# Patient Record
Sex: MALE | Race: BLACK OR AFRICAN AMERICAN | Employment: OTHER
[De-identification: names, ages, dates, MRNs, and addresses within clinical notes are randomized per-mention and may not be internally consistent; named-entity substitution may affect disease eponyms.]

---

## 2017-01-01 ENCOUNTER — HOME CARE VISIT (OUTPATIENT)
Dept: SCHEDULING | Facility: HOME HEALTH | Age: 72
End: 2017-01-01
Payer: MEDICARE

## 2017-01-01 ENCOUNTER — HOME CARE VISIT (OUTPATIENT)
Dept: HOSPICE | Facility: HOSPICE | Age: 72
End: 2017-01-01
Payer: MEDICARE

## 2017-01-01 ENCOUNTER — DOCUMENTATION ONLY (OUTPATIENT)
Dept: PALLATIVE CARE | Age: 72
End: 2017-01-01

## 2017-01-01 ENCOUNTER — NURSE NAVIGATOR (OUTPATIENT)
Dept: PALLATIVE CARE | Age: 72
End: 2017-01-01

## 2017-01-01 ENCOUNTER — HOSPITAL ENCOUNTER (EMERGENCY)
Age: 72
Discharge: HOME | End: 2017-10-11
Attending: EMERGENCY MEDICINE | Admitting: EMERGENCY MEDICINE
Payer: COMMERCIAL

## 2017-01-01 ENCOUNTER — APPOINTMENT (OUTPATIENT)
Dept: GENERAL RADIOLOGY | Age: 72
End: 2017-01-01
Attending: EMERGENCY MEDICINE
Payer: COMMERCIAL

## 2017-01-01 ENCOUNTER — HOSPITAL ENCOUNTER (EMERGENCY)
Age: 72
Discharge: HOME | End: 2017-02-19
Attending: EMERGENCY MEDICINE
Payer: COMMERCIAL

## 2017-01-01 ENCOUNTER — TELEPHONE (OUTPATIENT)
Dept: FAMILY MEDICINE CLINIC | Age: 72
End: 2017-01-01

## 2017-01-01 ENCOUNTER — HOSPICE ADMISSION (OUTPATIENT)
Dept: HOSPICE | Facility: HOSPICE | Age: 72
End: 2017-01-01
Payer: MEDICARE

## 2017-01-01 ENCOUNTER — OFFICE VISIT (OUTPATIENT)
Dept: PALLATIVE CARE | Age: 72
End: 2017-01-01

## 2017-01-01 ENCOUNTER — OFFICE VISIT (OUTPATIENT)
Dept: FAMILY MEDICINE CLINIC | Age: 72
End: 2017-01-01

## 2017-01-01 ENCOUNTER — APPOINTMENT (OUTPATIENT)
Dept: CT IMAGING | Age: 72
End: 2017-01-01
Attending: EMERGENCY MEDICINE
Payer: COMMERCIAL

## 2017-01-01 ENCOUNTER — HOSPITAL ENCOUNTER (EMERGENCY)
Age: 72
Discharge: HOME | End: 2017-08-29
Attending: EMERGENCY MEDICINE
Payer: COMMERCIAL

## 2017-01-01 ENCOUNTER — HOSPITAL ENCOUNTER (EMERGENCY)
Age: 72
Discharge: HOME | End: 2017-09-04
Attending: EMERGENCY MEDICINE
Payer: COMMERCIAL

## 2017-01-01 ENCOUNTER — TELEPHONE (OUTPATIENT)
Dept: PALLATIVE CARE | Age: 72
End: 2017-01-01

## 2017-01-01 ENCOUNTER — HOSPITAL ENCOUNTER (OUTPATIENT)
Dept: INTERVENTIONAL RADIOLOGY/VASCULAR | Age: 72
Discharge: HOME | End: 2017-11-07
Attending: INTERNAL MEDICINE
Payer: COMMERCIAL

## 2017-01-01 VITALS
HEART RATE: 54 BPM | OXYGEN SATURATION: 99 % | SYSTOLIC BLOOD PRESSURE: 142 MMHG | DIASTOLIC BLOOD PRESSURE: 74 MMHG | RESPIRATION RATE: 16 BRPM

## 2017-01-01 VITALS
DIASTOLIC BLOOD PRESSURE: 79 MMHG | SYSTOLIC BLOOD PRESSURE: 134 MMHG | TEMPERATURE: 98 F | HEART RATE: 78 BPM | RESPIRATION RATE: 16 BRPM | OXYGEN SATURATION: 98 %

## 2017-01-01 VITALS
RESPIRATION RATE: 18 BRPM | DIASTOLIC BLOOD PRESSURE: 68 MMHG | HEIGHT: 67 IN | BODY MASS INDEX: 18.05 KG/M2 | WEIGHT: 115 LBS | SYSTOLIC BLOOD PRESSURE: 122 MMHG | HEART RATE: 88 BPM | OXYGEN SATURATION: 100 % | TEMPERATURE: 97.6 F

## 2017-01-01 VITALS
RESPIRATION RATE: 14 BRPM | TEMPERATURE: 97.7 F | DIASTOLIC BLOOD PRESSURE: 79 MMHG | HEART RATE: 79 BPM | SYSTOLIC BLOOD PRESSURE: 162 MMHG | BODY MASS INDEX: 17.64 KG/M2 | WEIGHT: 126 LBS | OXYGEN SATURATION: 94 % | HEIGHT: 71 IN

## 2017-01-01 VITALS
SYSTOLIC BLOOD PRESSURE: 203 MMHG | HEART RATE: 86 BPM | HEIGHT: 71 IN | DIASTOLIC BLOOD PRESSURE: 117 MMHG | TEMPERATURE: 98.2 F | RESPIRATION RATE: 14 BRPM | OXYGEN SATURATION: 98 % | BODY MASS INDEX: 17.81 KG/M2 | WEIGHT: 127.21 LBS

## 2017-01-01 VITALS
SYSTOLIC BLOOD PRESSURE: 108 MMHG | WEIGHT: 120 LBS | OXYGEN SATURATION: 90 % | HEART RATE: 62 BPM | HEIGHT: 69 IN | BODY MASS INDEX: 17.77 KG/M2 | DIASTOLIC BLOOD PRESSURE: 56 MMHG | RESPIRATION RATE: 16 BRPM

## 2017-01-01 VITALS
HEART RATE: 73 BPM | SYSTOLIC BLOOD PRESSURE: 114 MMHG | OXYGEN SATURATION: 99 % | TEMPERATURE: 97.7 F | RESPIRATION RATE: 16 BRPM | DIASTOLIC BLOOD PRESSURE: 66 MMHG

## 2017-01-01 VITALS
SYSTOLIC BLOOD PRESSURE: 122 MMHG | DIASTOLIC BLOOD PRESSURE: 70 MMHG | HEART RATE: 67 BPM | TEMPERATURE: 97 F | RESPIRATION RATE: 16 BRPM | OXYGEN SATURATION: 95 %

## 2017-01-01 VITALS
SYSTOLIC BLOOD PRESSURE: 148 MMHG | TEMPERATURE: 97.3 F | DIASTOLIC BLOOD PRESSURE: 85 MMHG | RESPIRATION RATE: 18 BRPM | OXYGEN SATURATION: 100 %

## 2017-01-01 VITALS — SYSTOLIC BLOOD PRESSURE: 132 MMHG | DIASTOLIC BLOOD PRESSURE: 82 MMHG | HEART RATE: 80 BPM | RESPIRATION RATE: 18 BRPM

## 2017-01-01 VITALS
OXYGEN SATURATION: 99 % | HEART RATE: 57 BPM | DIASTOLIC BLOOD PRESSURE: 62 MMHG | RESPIRATION RATE: 16 BRPM | SYSTOLIC BLOOD PRESSURE: 108 MMHG | TEMPERATURE: 95.8 F

## 2017-01-01 VITALS
TEMPERATURE: 97.1 F | HEART RATE: 77 BPM | OXYGEN SATURATION: 97 % | RESPIRATION RATE: 20 BRPM | DIASTOLIC BLOOD PRESSURE: 86 MMHG | SYSTOLIC BLOOD PRESSURE: 142 MMHG

## 2017-01-01 VITALS
BODY MASS INDEX: 18.84 KG/M2 | DIASTOLIC BLOOD PRESSURE: 32 MMHG | RESPIRATION RATE: 16 BRPM | HEIGHT: 69 IN | HEART RATE: 78 BPM | WEIGHT: 127.21 LBS | TEMPERATURE: 97.5 F | SYSTOLIC BLOOD PRESSURE: 151 MMHG | OXYGEN SATURATION: 100 %

## 2017-01-01 VITALS
WEIGHT: 124 LBS | TEMPERATURE: 98 F | DIASTOLIC BLOOD PRESSURE: 84 MMHG | OXYGEN SATURATION: 91 % | HEART RATE: 94 BPM | HEIGHT: 71 IN | RESPIRATION RATE: 18 BRPM | BODY MASS INDEX: 17.36 KG/M2 | SYSTOLIC BLOOD PRESSURE: 138 MMHG

## 2017-01-01 VITALS
HEART RATE: 61 BPM | SYSTOLIC BLOOD PRESSURE: 98 MMHG | TEMPERATURE: 98.3 F | OXYGEN SATURATION: 99 % | RESPIRATION RATE: 16 BRPM | DIASTOLIC BLOOD PRESSURE: 50 MMHG

## 2017-01-01 VITALS
DIASTOLIC BLOOD PRESSURE: 70 MMHG | TEMPERATURE: 96.9 F | RESPIRATION RATE: 24 BRPM | HEART RATE: 81 BPM | OXYGEN SATURATION: 99 % | SYSTOLIC BLOOD PRESSURE: 130 MMHG

## 2017-01-01 DIAGNOSIS — J02.9 ACUTE PHARYNGITIS, UNSPECIFIED ETIOLOGY: Primary | ICD-10-CM

## 2017-01-01 DIAGNOSIS — B20 HIV (HUMAN IMMUNODEFICIENCY VIRUS INFECTION) (HCC): ICD-10-CM

## 2017-01-01 DIAGNOSIS — J18.9 CAP (COMMUNITY ACQUIRED PNEUMONIA): Primary | ICD-10-CM

## 2017-01-01 DIAGNOSIS — C32.0 VOCAL CORD CANCER (HCC): ICD-10-CM

## 2017-01-01 DIAGNOSIS — Z65.8 PSYCHOSOCIAL DISTRESS: ICD-10-CM

## 2017-01-01 DIAGNOSIS — J32.9 SINUSITIS, UNSPECIFIED CHRONICITY, UNSPECIFIED LOCATION: ICD-10-CM

## 2017-01-01 DIAGNOSIS — Z93.1 S/P PERCUTANEOUS ENDOSCOPIC GASTROSTOMY (PEG) TUBE PLACEMENT (HCC): ICD-10-CM

## 2017-01-01 DIAGNOSIS — C32.9 LARYNGEAL CANCER (HCC): ICD-10-CM

## 2017-01-01 DIAGNOSIS — G89.3 CANCER ASSOCIATED PAIN: ICD-10-CM

## 2017-01-01 DIAGNOSIS — M54.2 NECK PAIN: Primary | ICD-10-CM

## 2017-01-01 DIAGNOSIS — C32.9 LARYNGEAL CANCER (HCC): Primary | ICD-10-CM

## 2017-01-01 DIAGNOSIS — G44.219 EPISODIC TENSION-TYPE HEADACHE, NOT INTRACTABLE: ICD-10-CM

## 2017-01-01 DIAGNOSIS — Z93.0 TRACHEOSTOMY STATUS (HCC): ICD-10-CM

## 2017-01-01 DIAGNOSIS — C32.0 MALIGNANT NEOPLASM OF VOCAL CORD (HCC): ICD-10-CM

## 2017-01-01 DIAGNOSIS — M62.838 TRAPEZIUS MUSCLE SPASM: ICD-10-CM

## 2017-01-01 DIAGNOSIS — N17.9 AKI (ACUTE KIDNEY INJURY) (HCC): ICD-10-CM

## 2017-01-01 DIAGNOSIS — K94.23 MALFUNCTION OF GASTROSTOMY TUBE (HCC): Primary | ICD-10-CM

## 2017-01-01 DIAGNOSIS — R63.30 FEEDING DIFFICULTIES: ICD-10-CM

## 2017-01-01 DIAGNOSIS — B20 HIV (HUMAN IMMUNODEFICIENCY VIRUS INFECTION) (HCC): Primary | ICD-10-CM

## 2017-01-01 LAB
ALBUMIN SERPL BCP-MCNC: 2.6 G/DL (ref 3.5–5)
ALBUMIN SERPL-MCNC: 2.6 G/DL (ref 3.5–5)
ALBUMIN SERPL-MCNC: 2.8 G/DL (ref 3.5–5)
ALBUMIN/GLOB SERPL: 0.5 {RATIO} (ref 1.1–2.2)
ALBUMIN/GLOB SERPL: 0.5 {RATIO} (ref 1.1–2.2)
ALBUMIN/GLOB SERPL: 0.6 {RATIO} (ref 1.1–2.2)
ALP SERPL-CCNC: 100 U/L (ref 45–117)
ALP SERPL-CCNC: 88 U/L (ref 45–117)
ALP SERPL-CCNC: 89 U/L (ref 45–117)
ALT SERPL-CCNC: 10 U/L (ref 12–78)
ALT SERPL-CCNC: 25 U/L (ref 12–78)
ALT SERPL-CCNC: 25 U/L (ref 12–78)
ANION GAP BLD CALC-SCNC: 10 MMOL/L (ref 5–15)
ANION GAP SERPL CALC-SCNC: 4 MMOL/L (ref 5–15)
ANION GAP SERPL CALC-SCNC: 5 MMOL/L (ref 5–15)
APPEARANCE UR: CLEAR
APPEARANCE UR: CLEAR
AST SERPL W P-5'-P-CCNC: 12 U/L (ref 15–37)
AST SERPL-CCNC: 21 U/L (ref 15–37)
AST SERPL-CCNC: 21 U/L (ref 15–37)
ATRIAL RATE: 86 BPM
BACTERIA SPEC CULT: NORMAL
BACTERIA URNS QL MICRO: ABNORMAL /HPF
BACTERIA URNS QL MICRO: ABNORMAL /HPF
BASOPHILS # BLD AUTO: 0 K/UL (ref 0–0.1)
BASOPHILS # BLD: 0 % (ref 0–1)
BASOPHILS # BLD: 0 K/UL (ref 0–0.1)
BASOPHILS # BLD: 0 K/UL (ref 0–0.1)
BASOPHILS NFR BLD: 0 % (ref 0–1)
BASOPHILS NFR BLD: 0 % (ref 0–1)
BILIRUB SERPL-MCNC: 0.2 MG/DL (ref 0.2–1)
BILIRUB SERPL-MCNC: 0.2 MG/DL (ref 0.2–1)
BILIRUB SERPL-MCNC: 0.5 MG/DL (ref 0.2–1)
BILIRUB UR QL: NEGATIVE
BILIRUB UR QL: NEGATIVE
BUN SERPL-MCNC: 15 MG/DL (ref 6–20)
BUN SERPL-MCNC: 16 MG/DL (ref 6–20)
BUN SERPL-MCNC: 19 MG/DL (ref 6–20)
BUN/CREAT SERPL: 13 (ref 12–20)
BUN/CREAT SERPL: 17 (ref 12–20)
BUN/CREAT SERPL: 17 (ref 12–20)
CALCIUM SERPL-MCNC: 8.3 MG/DL (ref 8.5–10.1)
CALCIUM SERPL-MCNC: 8.7 MG/DL (ref 8.5–10.1)
CALCIUM SERPL-MCNC: 8.8 MG/DL (ref 8.5–10.1)
CALCULATED P AXIS, ECG09: 71 DEGREES
CALCULATED R AXIS, ECG10: 74 DEGREES
CALCULATED T AXIS, ECG11: 77 DEGREES
CHLORIDE SERPL-SCNC: 101 MMOL/L (ref 97–108)
CHLORIDE SERPL-SCNC: 94 MMOL/L (ref 97–108)
CHLORIDE SERPL-SCNC: 99 MMOL/L (ref 97–108)
CO2 SERPL-SCNC: 26 MMOL/L (ref 21–32)
CO2 SERPL-SCNC: 30 MMOL/L (ref 21–32)
CO2 SERPL-SCNC: 30 MMOL/L (ref 21–32)
COLOR UR: ABNORMAL
COLOR UR: ABNORMAL
CREAT SERPL-MCNC: 0.86 MG/DL (ref 0.7–1.3)
CREAT SERPL-MCNC: 0.96 MG/DL (ref 0.7–1.3)
CREAT SERPL-MCNC: 1.47 MG/DL (ref 0.7–1.3)
DEPRECATED S PYO AG THROAT QL EIA: NEGATIVE
DIAGNOSIS, 93000: NORMAL
EOSINOPHIL # BLD: 0.1 K/UL (ref 0–0.4)
EOSINOPHIL # BLD: 0.2 K/UL (ref 0–0.4)
EOSINOPHIL # BLD: 0.2 K/UL (ref 0–0.4)
EOSINOPHIL NFR BLD: 1 % (ref 0–7)
EOSINOPHIL NFR BLD: 1 % (ref 0–7)
EOSINOPHIL NFR BLD: 2 % (ref 0–7)
EPITH CASTS URNS QL MICRO: ABNORMAL /LPF
EPITH CASTS URNS QL MICRO: ABNORMAL /LPF
ERYTHROCYTE [DISTWIDTH] IN BLOOD BY AUTOMATED COUNT: 13.3 % (ref 11.5–14.5)
ERYTHROCYTE [DISTWIDTH] IN BLOOD BY AUTOMATED COUNT: 16.9 % (ref 11.5–14.5)
ERYTHROCYTE [DISTWIDTH] IN BLOOD BY AUTOMATED COUNT: 17.8 % (ref 11.5–14.5)
GLOBULIN SER CALC-MCNC: 4.9 G/DL (ref 2–4)
GLOBULIN SER CALC-MCNC: 5.2 G/DL (ref 2–4)
GLOBULIN SER CALC-MCNC: 5.6 G/DL (ref 2–4)
GLUCOSE SERPL-MCNC: 73 MG/DL (ref 65–100)
GLUCOSE SERPL-MCNC: 76 MG/DL (ref 65–100)
GLUCOSE SERPL-MCNC: 96 MG/DL (ref 65–100)
GLUCOSE UR STRIP.AUTO-MCNC: NEGATIVE MG/DL
GLUCOSE UR STRIP.AUTO-MCNC: NEGATIVE MG/DL
HCT VFR BLD AUTO: 27.4 % (ref 36.6–50.3)
HCT VFR BLD AUTO: 31 % (ref 36.6–50.3)
HCT VFR BLD AUTO: 32.1 % (ref 36.6–50.3)
HGB BLD-MCNC: 10.3 G/DL (ref 12.1–17)
HGB BLD-MCNC: 10.7 G/DL (ref 12.1–17)
HGB BLD-MCNC: 8.9 G/DL (ref 12.1–17)
HGB UR QL STRIP: NEGATIVE
HGB UR QL STRIP: NEGATIVE
HYALINE CASTS URNS QL MICRO: ABNORMAL /LPF (ref 0–5)
KETONES UR QL STRIP.AUTO: NEGATIVE MG/DL
KETONES UR QL STRIP.AUTO: NEGATIVE MG/DL
LACTATE SERPL-SCNC: 0.7 MMOL/L (ref 0.4–2)
LEUKOCYTE ESTERASE UR QL STRIP.AUTO: ABNORMAL
LEUKOCYTE ESTERASE UR QL STRIP.AUTO: ABNORMAL
LYMPHOCYTES # BLD AUTO: 27 % (ref 12–49)
LYMPHOCYTES # BLD: 4.2 K/UL (ref 0.8–3.5)
LYMPHOCYTES # BLD: 4.4 K/UL (ref 0.8–3.5)
LYMPHOCYTES # BLD: 4.6 K/UL (ref 0.8–3.5)
LYMPHOCYTES NFR BLD: 54 % (ref 12–49)
LYMPHOCYTES NFR BLD: 56 % (ref 12–49)
MCH RBC QN AUTO: 30.5 PG (ref 26–34)
MCH RBC QN AUTO: 31.1 PG (ref 26–34)
MCH RBC QN AUTO: 31.2 PG (ref 26–34)
MCHC RBC AUTO-ENTMCNC: 32.5 G/DL (ref 30–36.5)
MCHC RBC AUTO-ENTMCNC: 33.2 G/DL (ref 30–36.5)
MCHC RBC AUTO-ENTMCNC: 33.3 G/DL (ref 30–36.5)
MCV RBC AUTO: 91.5 FL (ref 80–99)
MCV RBC AUTO: 93.7 FL (ref 80–99)
MCV RBC AUTO: 96.1 FL (ref 80–99)
MONOCYTES # BLD: 0.4 K/UL (ref 0–1)
MONOCYTES # BLD: 0.4 K/UL (ref 0–1)
MONOCYTES # BLD: 0.6 K/UL (ref 0–1)
MONOCYTES NFR BLD AUTO: 3 % (ref 5–13)
MONOCYTES NFR BLD: 6 % (ref 5–13)
MONOCYTES NFR BLD: 7 % (ref 5–13)
NEUTS SEG # BLD: 11.3 K/UL (ref 1.8–8)
NEUTS SEG # BLD: 2.9 K/UL (ref 1.8–8)
NEUTS SEG # BLD: 3.1 K/UL (ref 1.8–8)
NEUTS SEG NFR BLD AUTO: 69 % (ref 32–75)
NEUTS SEG NFR BLD: 35 % (ref 32–75)
NEUTS SEG NFR BLD: 39 % (ref 32–75)
NITRITE UR QL STRIP.AUTO: NEGATIVE
NITRITE UR QL STRIP.AUTO: NEGATIVE
P-R INTERVAL, ECG05: 134 MS
PH UR STRIP: 7 [PH] (ref 5–8)
PH UR STRIP: 7.5 [PH] (ref 5–8)
PLATELET # BLD AUTO: 319 K/UL (ref 150–400)
PLATELET # BLD AUTO: 324 K/UL (ref 150–400)
PLATELET # BLD AUTO: 454 K/UL (ref 150–400)
POTASSIUM SERPL-SCNC: 3.9 MMOL/L (ref 3.5–5.1)
POTASSIUM SERPL-SCNC: 4.2 MMOL/L (ref 3.5–5.1)
POTASSIUM SERPL-SCNC: 4.2 MMOL/L (ref 3.5–5.1)
PROT SERPL-MCNC: 7.7 G/DL (ref 6.4–8.2)
PROT SERPL-MCNC: 7.8 G/DL (ref 6.4–8.2)
PROT SERPL-MCNC: 8.2 G/DL (ref 6.4–8.2)
PROT UR STRIP-MCNC: NEGATIVE MG/DL
PROT UR STRIP-MCNC: NEGATIVE MG/DL
Q-T INTERVAL, ECG07: 362 MS
QRS DURATION, ECG06: 80 MS
QTC CALCULATION (BEZET), ECG08: 433 MS
RBC # BLD AUTO: 2.85 M/UL (ref 4.1–5.7)
RBC # BLD AUTO: 3.31 M/UL (ref 4.1–5.7)
RBC # BLD AUTO: 3.51 M/UL (ref 4.1–5.7)
RBC #/AREA URNS HPF: ABNORMAL /HPF (ref 0–5)
RBC #/AREA URNS HPF: ABNORMAL /HPF (ref 0–5)
SERVICE CMNT-IMP: NORMAL
SODIUM SERPL-SCNC: 129 MMOL/L (ref 136–145)
SODIUM SERPL-SCNC: 133 MMOL/L (ref 136–145)
SODIUM SERPL-SCNC: 137 MMOL/L (ref 136–145)
SP GR UR REFRACTOMETRY: 1.01 (ref 1–1.03)
SP GR UR REFRACTOMETRY: 1.01 (ref 1–1.03)
TROPONIN I SERPL-MCNC: <0.04 NG/ML
UROBILINOGEN UR QL STRIP.AUTO: 0.2 EU/DL (ref 0.2–1)
UROBILINOGEN UR QL STRIP.AUTO: 1 EU/DL (ref 0.2–1)
VENTRICULAR RATE, ECG03: 86 BPM
WBC # BLD AUTO: 16.3 K/UL (ref 4.1–11.1)
WBC # BLD AUTO: 7.8 K/UL (ref 4.1–11.1)
WBC # BLD AUTO: 8.2 K/UL (ref 4.1–11.1)
WBC URNS QL MICRO: ABNORMAL /HPF (ref 0–4)
WBC URNS QL MICRO: ABNORMAL /HPF (ref 0–4)

## 2017-01-01 PROCEDURE — 0651 HSPC ROUTINE HOME CARE

## 2017-01-01 PROCEDURE — 74011250637 HC RX REV CODE- 250/637: Performed by: EMERGENCY MEDICINE

## 2017-01-01 PROCEDURE — A9270 NON-COVERED ITEM OR SERVICE: HCPCS

## 2017-01-01 PROCEDURE — 74011636320 HC RX REV CODE- 636/320: Performed by: RADIOLOGY

## 2017-01-01 PROCEDURE — T4541 LARGE DISPOSABLE UNDERPAD: HCPCS

## 2017-01-01 PROCEDURE — HOSPICE MEDICATION HC HH HOSPICE MEDICATION

## 2017-01-01 PROCEDURE — A7526 TRACHEOSTOMY TUBE COLLAR: HCPCS

## 2017-01-01 PROCEDURE — 85025 COMPLETE CBC W/AUTO DIFF WBC: CPT | Performed by: EMERGENCY MEDICINE

## 2017-01-01 PROCEDURE — T4526 ADULT SIZE PULL-ON MED: HCPCS

## 2017-01-01 PROCEDURE — G0299 HHS/HOSPICE OF RN EA 15 MIN: HCPCS

## 2017-01-01 PROCEDURE — 36415 COLL VENOUS BLD VENIPUNCTURE: CPT | Performed by: EMERGENCY MEDICINE

## 2017-01-01 PROCEDURE — 77030018836 HC SOL IRR NACL ICUM -A

## 2017-01-01 PROCEDURE — 81001 URINALYSIS AUTO W/SCOPE: CPT | Performed by: EMERGENCY MEDICINE

## 2017-01-01 PROCEDURE — 74011636320 HC RX REV CODE- 636/320: Performed by: EMERGENCY MEDICINE

## 2017-01-01 PROCEDURE — 77030005103 HC CATH GASTMY BLN HALY -B

## 2017-01-01 PROCEDURE — 96361 HYDRATE IV INFUSION ADD-ON: CPT

## 2017-01-01 PROCEDURE — 99284 EMERGENCY DEPT VISIT MOD MDM: CPT

## 2017-01-01 PROCEDURE — HHS10554 SHAMPOO/BODY WASH 8 OZ ALOE VESTA

## 2017-01-01 PROCEDURE — 93005 ELECTROCARDIOGRAM TRACING: CPT

## 2017-01-01 PROCEDURE — 3336500001 HSPC ELECTION

## 2017-01-01 PROCEDURE — A6216 NON-STERILE GAUZE<=16 SQ IN: HCPCS

## 2017-01-01 PROCEDURE — 74011000250 HC RX REV CODE- 250: Performed by: EMERGENCY MEDICINE

## 2017-01-01 PROCEDURE — 80053 COMPREHEN METABOLIC PANEL: CPT | Performed by: EMERGENCY MEDICINE

## 2017-01-01 PROCEDURE — A6402 STERILE GAUZE <= 16 SQ IN: HCPCS

## 2017-01-01 PROCEDURE — 74011250636 HC RX REV CODE- 250/636: Performed by: EMERGENCY MEDICINE

## 2017-01-01 PROCEDURE — A4452 WATERPROOF TAPE: HCPCS

## 2017-01-01 PROCEDURE — 99282 EMERGENCY DEPT VISIT SF MDM: CPT

## 2017-01-01 PROCEDURE — A6250 SKIN SEAL PROTECT MOISTURIZR: HCPCS

## 2017-01-01 PROCEDURE — 77030013140 HC MSK NEB VYRM -A

## 2017-01-01 PROCEDURE — G0300 HHS/HOSPICE OF LPN EA 15 MIN: HCPCS

## 2017-01-01 PROCEDURE — 43760 IR CHANGE GASTROSTOMY TUBE PERC: CPT

## 2017-01-01 PROCEDURE — 70491 CT SOFT TISSUE NECK W/DYE: CPT

## 2017-01-01 PROCEDURE — 71010 XR CHEST PORT: CPT

## 2017-01-01 PROCEDURE — 87070 CULTURE OTHR SPECIMN AEROBIC: CPT | Performed by: EMERGENCY MEDICINE

## 2017-01-01 PROCEDURE — 83605 ASSAY OF LACTIC ACID: CPT | Performed by: EMERGENCY MEDICINE

## 2017-01-01 PROCEDURE — 96374 THER/PROPH/DIAG INJ IV PUSH: CPT

## 2017-01-01 PROCEDURE — 84484 ASSAY OF TROPONIN QUANT: CPT | Performed by: EMERGENCY MEDICINE

## 2017-01-01 PROCEDURE — 74000 XR ABD (KUB): CPT

## 2017-01-01 PROCEDURE — C1788 PORT, INDWELLING, IMP: HCPCS

## 2017-01-01 PROCEDURE — G0155 HHCP-SVS OF CSW,EA 15 MIN: HCPCS

## 2017-01-01 PROCEDURE — 94640 AIRWAY INHALATION TREATMENT: CPT

## 2017-01-01 PROCEDURE — C1769 GUIDE WIRE: HCPCS

## 2017-01-01 PROCEDURE — 87880 STREP A ASSAY W/OPTIC: CPT | Performed by: EMERGENCY MEDICINE

## 2017-01-01 PROCEDURE — 74011000250 HC RX REV CODE- 250: Performed by: RADIOLOGY

## 2017-01-01 RX ORDER — OXYCODONE HYDROCHLORIDE 5 MG/1
5 CAPSULE ORAL
Qty: 5 CAP | Refills: 0 | Status: SHIPPED | OUTPATIENT
Start: 2017-01-01

## 2017-01-01 RX ORDER — ETRAVIRINE 200 MG/1
200 TABLET ORAL
COMMUNITY
Start: 2017-01-01 | End: 2017-01-01 | Stop reason: CLARIF

## 2017-01-01 RX ORDER — ABACAVIR AND LAMIVUDINE 600; 300 MG/1; MG/1
1 TABLET, FILM COATED ORAL DAILY
COMMUNITY
Start: 2017-01-01 | End: 2017-01-01 | Stop reason: CLARIF

## 2017-01-01 RX ORDER — GUAIFENESIN 600 MG/1
600 TABLET, EXTENDED RELEASE ORAL 2 TIMES DAILY
Qty: 15 TAB | Refills: 0 | Status: SHIPPED | OUTPATIENT
Start: 2017-01-01

## 2017-01-01 RX ORDER — SODIUM CHLORIDE 0.9 % (FLUSH) 0.9 %
10 SYRINGE (ML) INJECTION
Status: COMPLETED | OUTPATIENT
Start: 2017-01-01 | End: 2017-01-01

## 2017-01-01 RX ORDER — LIDOCAINE HYDROCHLORIDE 20 MG/ML
JELLY TOPICAL ONCE
Status: COMPLETED | OUTPATIENT
Start: 2017-01-01 | End: 2017-01-01

## 2017-01-01 RX ORDER — OXYCODONE HYDROCHLORIDE 10 MG/1
10 TABLET ORAL
Qty: 60 TAB | Refills: 0 | Status: SHIPPED | OUTPATIENT
Start: 2017-01-01

## 2017-01-01 RX ORDER — IPRATROPIUM BROMIDE AND ALBUTEROL SULFATE 2.5; .5 MG/3ML; MG/3ML
3 SOLUTION RESPIRATORY (INHALATION)
Status: COMPLETED | OUTPATIENT
Start: 2017-01-01 | End: 2017-01-01

## 2017-01-01 RX ORDER — LEVOFLOXACIN 500 MG/1
500 TABLET, FILM COATED ORAL DAILY
Qty: 7 TAB | Refills: 0 | Status: SHIPPED | OUTPATIENT
Start: 2017-01-01 | End: 2017-01-01

## 2017-01-01 RX ORDER — KETOROLAC TROMETHAMINE 30 MG/ML
15 INJECTION, SOLUTION INTRAMUSCULAR; INTRAVENOUS
Status: COMPLETED | OUTPATIENT
Start: 2017-01-01 | End: 2017-01-01

## 2017-01-01 RX ORDER — SODIUM CHLORIDE 9 MG/ML
50 INJECTION, SOLUTION INTRAVENOUS
Status: COMPLETED | OUTPATIENT
Start: 2017-01-01 | End: 2017-01-01

## 2017-01-01 RX ORDER — MORPHINE SULFATE 2 MG/ML
4 INJECTION, SOLUTION INTRAMUSCULAR; INTRAVENOUS
Status: COMPLETED | OUTPATIENT
Start: 2017-01-01 | End: 2017-01-01

## 2017-01-01 RX ORDER — AMOXICILLIN 250 MG
2 CAPSULE ORAL
Qty: 120 TAB | Refills: 0 | Status: SHIPPED | OUTPATIENT
Start: 2017-01-01

## 2017-01-01 RX ORDER — LEVOFLOXACIN 500 MG/1
500 TABLET, FILM COATED ORAL
Status: COMPLETED | OUTPATIENT
Start: 2017-01-01 | End: 2017-01-01

## 2017-01-01 RX ORDER — OXYCODONE AND ACETAMINOPHEN 10; 325 MG/1; MG/1
1 TABLET ORAL
Qty: 30 TAB | Refills: 0 | Status: SHIPPED | OUTPATIENT
Start: 2017-01-01 | End: 2017-01-01 | Stop reason: ALTCHOICE

## 2017-01-01 RX ORDER — OXYCODONE AND ACETAMINOPHEN 5; 325 MG/1; MG/1
1 TABLET ORAL ONCE
Status: COMPLETED | OUTPATIENT
Start: 2017-01-01 | End: 2017-01-01

## 2017-01-01 RX ORDER — AZITHROMYCIN 500 MG/1
500 TABLET, FILM COATED ORAL DAILY
Qty: 7 TAB | Refills: 0 | Status: SHIPPED | OUTPATIENT
Start: 2017-01-01

## 2017-01-01 RX ORDER — ACETAMINOPHEN 500 MG
1000 TABLET ORAL ONCE
Status: DISCONTINUED | OUTPATIENT
Start: 2017-01-01 | End: 2017-01-01 | Stop reason: HOSPADM

## 2017-01-01 RX ADMIN — MORPHINE SULFATE 4 MG: 2 INJECTION, SOLUTION INTRAMUSCULAR; INTRAVENOUS at 16:45

## 2017-01-01 RX ADMIN — LIDOCAINE HYDROCHLORIDE: 20 JELLY TOPICAL at 15:01

## 2017-01-01 RX ADMIN — SODIUM CHLORIDE 50 ML/HR: 900 INJECTION, SOLUTION INTRAVENOUS at 22:11

## 2017-01-01 RX ADMIN — OXYCODONE HYDROCHLORIDE AND ACETAMINOPHEN 1 TABLET: 5; 325 TABLET ORAL at 22:00

## 2017-01-01 RX ADMIN — IOPAMIDOL 20 ML: 755 INJECTION, SOLUTION INTRAVENOUS at 15:01

## 2017-01-01 RX ADMIN — DIATRIZOATE MEGLUMINE AND DIATRIZOATE SODIUM 30 ML: 600; 100 SOLUTION ORAL; RECTAL at 20:48

## 2017-01-01 RX ADMIN — KETOROLAC TROMETHAMINE 15 MG: 30 INJECTION, SOLUTION INTRAMUSCULAR at 23:04

## 2017-01-01 RX ADMIN — SODIUM CHLORIDE 1000 ML: 900 INJECTION, SOLUTION INTRAVENOUS at 04:58

## 2017-01-01 RX ADMIN — KETOROLAC TROMETHAMINE 15 MG: 30 INJECTION, SOLUTION INTRAMUSCULAR at 03:34

## 2017-01-01 RX ADMIN — IPRATROPIUM BROMIDE AND ALBUTEROL SULFATE 3 ML: .5; 3 SOLUTION RESPIRATORY (INHALATION) at 03:00

## 2017-01-01 RX ADMIN — LEVOFLOXACIN 500 MG: 500 TABLET, FILM COATED ORAL at 04:58

## 2017-01-01 RX ADMIN — SODIUM CHLORIDE 1000 ML: 900 INJECTION, SOLUTION INTRAVENOUS at 16:06

## 2017-01-01 RX ADMIN — Medication 10 ML: at 22:11

## 2017-01-01 RX ADMIN — IOPAMIDOL 100 ML: 612 INJECTION, SOLUTION INTRAVENOUS at 22:11

## 2017-01-21 ENCOUNTER — HOSPITAL ENCOUNTER (EMERGENCY)
Age: 72
Discharge: HOME | End: 2017-01-21
Attending: EMERGENCY MEDICINE | Admitting: EMERGENCY MEDICINE
Payer: COMMERCIAL

## 2017-01-21 ENCOUNTER — APPOINTMENT (OUTPATIENT)
Dept: GENERAL RADIOLOGY | Age: 72
End: 2017-01-21
Attending: EMERGENCY MEDICINE
Payer: COMMERCIAL

## 2017-01-21 VITALS
SYSTOLIC BLOOD PRESSURE: 118 MMHG | DIASTOLIC BLOOD PRESSURE: 69 MMHG | WEIGHT: 130.73 LBS | TEMPERATURE: 98.1 F | BODY MASS INDEX: 19.88 KG/M2

## 2017-01-21 DIAGNOSIS — K59.00 CONSTIPATION, UNSPECIFIED CONSTIPATION TYPE: Primary | ICD-10-CM

## 2017-01-21 DIAGNOSIS — C32.9 LARYNGEAL CANCER (HCC): ICD-10-CM

## 2017-01-21 DIAGNOSIS — C78.00 MALIGNANT NEOPLASM METASTATIC TO LUNG, UNSPECIFIED LATERALITY (HCC): ICD-10-CM

## 2017-01-21 LAB
ABO + RH BLD: NORMAL
ALBUMIN SERPL BCP-MCNC: 2.8 G/DL (ref 3.5–5)
ALBUMIN/GLOB SERPL: 0.5 {RATIO} (ref 1.1–2.2)
ALP SERPL-CCNC: 102 U/L (ref 45–117)
ALT SERPL-CCNC: 12 U/L (ref 12–78)
ANION GAP BLD CALC-SCNC: 8 MMOL/L (ref 5–15)
AST SERPL W P-5'-P-CCNC: 13 U/L (ref 15–37)
BASOPHILS # BLD AUTO: 0 K/UL (ref 0–0.1)
BASOPHILS # BLD: 0 % (ref 0–1)
BILIRUB SERPL-MCNC: 0.4 MG/DL (ref 0.2–1)
BLOOD GROUP ANTIBODIES SERPL: NORMAL
BUN SERPL-MCNC: 17 MG/DL (ref 6–20)
BUN/CREAT SERPL: 16 (ref 12–20)
CALCIUM SERPL-MCNC: 9 MG/DL (ref 8.5–10.1)
CHLORIDE SERPL-SCNC: 96 MMOL/L (ref 97–108)
CO2 SERPL-SCNC: 31 MMOL/L (ref 21–32)
CREAT SERPL-MCNC: 1.04 MG/DL (ref 0.7–1.3)
EOSINOPHIL # BLD: 0 K/UL (ref 0–0.4)
EOSINOPHIL NFR BLD: 0 % (ref 0–7)
ERYTHROCYTE [DISTWIDTH] IN BLOOD BY AUTOMATED COUNT: 12.3 % (ref 11.5–14.5)
GLOBULIN SER CALC-MCNC: 5.1 G/DL (ref 2–4)
GLUCOSE SERPL-MCNC: 93 MG/DL (ref 65–100)
HCT VFR BLD AUTO: 30.4 % (ref 36.6–50.3)
HGB BLD-MCNC: 10 G/DL (ref 12.1–17)
LYMPHOCYTES # BLD AUTO: 30 % (ref 12–49)
LYMPHOCYTES # BLD: 3.3 K/UL (ref 0.8–3.5)
MCH RBC QN AUTO: 32.3 PG (ref 26–34)
MCHC RBC AUTO-ENTMCNC: 32.9 G/DL (ref 30–36.5)
MCV RBC AUTO: 98.1 FL (ref 80–99)
MONOCYTES # BLD: 1.2 K/UL (ref 0–1)
MONOCYTES NFR BLD AUTO: 11 % (ref 5–13)
NEUTS SEG # BLD: 6.6 K/UL (ref 1.8–8)
NEUTS SEG NFR BLD AUTO: 59 % (ref 32–75)
PLATELET # BLD AUTO: 328 K/UL (ref 150–400)
POTASSIUM SERPL-SCNC: 4.3 MMOL/L (ref 3.5–5.1)
PROT SERPL-MCNC: 7.9 G/DL (ref 6.4–8.2)
RBC # BLD AUTO: 3.1 M/UL (ref 4.1–5.7)
SODIUM SERPL-SCNC: 135 MMOL/L (ref 136–145)
SPECIMEN EXP DATE BLD: NORMAL
WBC # BLD AUTO: 11.2 K/UL (ref 4.1–11.1)

## 2017-01-21 PROCEDURE — 99283 EMERGENCY DEPT VISIT LOW MDM: CPT

## 2017-01-21 PROCEDURE — 74022 RADEX COMPL AQT ABD SERIES: CPT

## 2017-01-21 PROCEDURE — 36415 COLL VENOUS BLD VENIPUNCTURE: CPT | Performed by: EMERGENCY MEDICINE

## 2017-01-21 PROCEDURE — 80053 COMPREHEN METABOLIC PANEL: CPT | Performed by: EMERGENCY MEDICINE

## 2017-01-21 PROCEDURE — 85025 COMPLETE CBC W/AUTO DIFF WBC: CPT | Performed by: EMERGENCY MEDICINE

## 2017-01-21 PROCEDURE — 96360 HYDRATION IV INFUSION INIT: CPT

## 2017-01-21 PROCEDURE — 74011250636 HC RX REV CODE- 250/636: Performed by: EMERGENCY MEDICINE

## 2017-01-21 PROCEDURE — 86900 BLOOD TYPING SEROLOGIC ABO: CPT | Performed by: EMERGENCY MEDICINE

## 2017-01-21 RX ORDER — DOCUSATE SODIUM 60 MG/15ML
50 SYRUP ORAL 2 TIMES DAILY
Qty: 473 ML | Refills: 0 | Status: SHIPPED | OUTPATIENT
Start: 2017-01-21

## 2017-01-21 RX ORDER — HYDROCODONE BITARTRATE AND ACETAMINOPHEN 5; 325 MG/1; MG/1
1 TABLET ORAL
Qty: 20 TAB | Refills: 0 | Status: SHIPPED | OUTPATIENT
Start: 2017-01-21 | End: 2017-01-01

## 2017-01-21 RX ADMIN — SODIUM CHLORIDE 1000 ML: 900 INJECTION, SOLUTION INTRAVENOUS at 13:57

## 2017-01-21 NOTE — ED NOTES
Patient states that after enema he had a \"big bowel movement and feels better\".  MD Perez Comment made aware

## 2017-01-21 NOTE — DISCHARGE INSTRUCTIONS
Constipation: Care Instructions  Your Care Instructions  Constipation means that you have a hard time passing stools (bowel movements). People pass stools from 3 times a day to once every 3 days. What is normal for you may be different. Constipation may occur with pain in the rectum and cramping. The pain may get worse when you try to pass stools. Sometimes there are small amounts of bright red blood on toilet paper or the surface of stools. This is because of enlarged veins near the rectum (hemorrhoids). A few changes in your diet and lifestyle may help you avoid ongoing constipation. Your doctor may also prescribe medicine to help loosen your stool. Some medicines can cause constipation. These include pain medicines and antidepressants. Tell your doctor about all the medicines you take. Your doctor may want to make a medicine change to ease your symptoms. Follow-up care is a key part of your treatment and safety. Be sure to make and go to all appointments, and call your doctor if you are having problems. It's also a good idea to know your test results and keep a list of the medicines you take. How can you care for yourself at home? · Drink plenty of fluids, enough so that your urine is light yellow or clear like water. If you have kidney, heart, or liver disease and have to limit fluids, talk with your doctor before you increase the amount of fluids you drink. · Include high-fiber foods in your diet each day. These include fruits, vegetables, beans, and whole grains. · Get at least 30 minutes of exercise on most days of the week. Walking is a good choice. You also may want to do other activities, such as running, swimming, cycling, or playing tennis or team sports. · Take a fiber supplement, such as Citrucel or Metamucil, every day. Read and follow all instructions on the label. · Schedule time each day for a bowel movement. A daily routine may help.  Take your time having your bowel movement. · Support your feet with a small step stool when you sit on the toilet. This helps flex your hips and places your pelvis in a squatting position. · Your doctor may recommend an over-the-counter laxative to relieve your constipation. Examples are Milk of Magnesia and MiraLax. Read and follow all instructions on the label. Do not use laxatives on a long-term basis. When should you call for help? Call your doctor now or seek immediate medical care if:  · You have new or worse belly pain. · You have new or worse nausea or vomiting. · You have blood in your stools. Watch closely for changes in your health, and be sure to contact your doctor if:  · Your constipation is getting worse. · You do not get better as expected. Where can you learn more? Go to http://leandra-jag.info/. Enter 21 [phone] in the search box to learn more about \"Constipation: Care Instructions. \"  Current as of: May 27, 2016  Content Version: 11.1  © 9887-7300 "[employer] Incorporated. Care instructions adapted under license by [employer] (which disclaims liability or warranty for this information). If you have questions about a medical condition or this instruction, always ask your healthcare professional. [address] any warranty or liability for your use of this information.

## 2017-01-21 NOTE — ED NOTES
Patient ambulatory from triage with steady gait. Patient complain of epistaxis yesterday and constipation for two days. Patient denies any other symptoms. No actively bleeding noted at this time. Call bell within reach.

## 2017-01-21 NOTE — ED PROVIDER NOTES
HPI Comments: Madai Machado is a 73 y.o. male with PMhx significant for HIV and laryngeal CA who presents ambulatory to the ED with cc of constipation x 3 days since his last bowel movement. He denies any nausea or vomiting and reports that he is still passing flatus. He notes presence of a PEG tube to the abdomen that is without complications, however, he notes he cannot eat orally due to hx of laryngeal CA. He states that he is currently out of his medications and notes that he has a hx of constipation. He is also c/o a nose bleed which started last night that he states did not cease bleeding until this morning. He denies any anticoagulant use. He denies any nausea, vomiting, abdominal pain, or appetite changes. PCP: Dejon Kilpatrick MD    There are no other complaints, changes or physical findings at this time. The history is provided by the patient. Past Medical History:   Diagnosis Date    HIV (human immunodeficiency virus infection) ([facility] [address]        No past surgical history on file. No family history on file. Social History     Social History    Marital status:      Spouse name: N/A    Number of children: N/A    Years of education: N/A     Occupational History    Not on file. Social History Main Topics    Smoking status: Current Every Day Smoker    Smokeless tobacco: Not on file    Alcohol use Yes    Drug use: Yes     Special: Marijuana    Sexual activity: Not on file     Other Topics Concern    Not on file     Social History Narrative    No narrative on file         ALLERGIES: Review of patient's allergies indicates no known allergies. Review of Systems   Constitutional: Negative for activity change, appetite change, chills and fever. HENT: Positive for nosebleeds. Negative for congestion and sore throat. Eyes: Negative for pain and redness. Respiratory: Negative for cough, chest tightness and shortness of breath.     Cardiovascular: Negative for chest pain and palpitations. Gastrointestinal: Positive for constipation. Negative for abdominal pain, diarrhea, nausea and vomiting. Genitourinary: Negative for dysuria, frequency and urgency. Musculoskeletal: Negative for back pain and neck pain. Skin: Negative for rash. Neurological: Negative for syncope, light-headedness and headaches. Psychiatric/Behavioral: Negative for confusion. All other systems reviewed and are negative. Vitals:    01/21/17 1158   BP: 118/69   Temp: 98.1 °F (36.7 °C)   Weight: 59.3 kg (130 lb 11.7 oz)            Physical Exam   Constitutional: He is oriented to person, place, and time. He appears well-developed. He appears cachectic. No distress. HENT:   Head: Normocephalic. Nose: Nose normal.   Mouth/Throat: Oropharynx is clear and moist. No oropharyngeal exudate. Eyes: Conjunctivae are normal. Pupils are equal, round, and reactive to light. No scleral icterus. Neck: Normal range of motion. Neck supple. No JVD present. No tracheal deviation present. No thyromegaly present. Cardiovascular: Normal rate, regular rhythm and intact distal pulses. Exam reveals no gallop and no friction rub. No murmur heard. Pulmonary/Chest: Effort normal and breath sounds normal. No stridor. No respiratory distress. He has no wheezes. He has no rales. Abdominal: Soft. Bowel sounds are normal. He exhibits no distension. There is no tenderness. There is no rebound and no guarding. There is a PEG tube in place. Musculoskeletal: Normal range of motion. He exhibits no edema. Lymphadenopathy:     He has no cervical adenopathy. Neurological: He is alert and oriented to person, place, and time. No cranial nerve deficit. He exhibits normal muscle tone. Coordination normal.   Skin: Skin is warm and dry. No rash noted. He is not diaphoretic. No erythema. Psychiatric: He has a normal mood and affect. His behavior is normal.   Nursing note and vitals reviewed.        MDM  Number of Diagnoses or Management Options  Constipation, unspecified constipation type:   Laryngeal cancer [facility]):   Malignant neoplasm metastatic to lung, unspecified laterality [facility]):   Diagnosis management comments: DDx: SBO, constipation, fecal impaction       Amount and/or Complexity of Data Reviewed  Clinical lab tests: reviewed and ordered  Tests in the radiology section of CPT®: reviewed and ordered  Review and summarize past medical records: yes    Risk of Complications, Morbidity, and/or Mortality  General comments: No evidence of obstruction; hx of laryngeal cancer with lung mets followed by ent at [facility]; good results with enema; pt feeling much better; dc home with daily colace liquid thru peg tube; pt agreed to follow up with [facility] ent as scheduled next week; Candice Hensley MD      Patient Progress  Patient progress: stable        Procedures    PROGRESS NOTE:  4:05 PM  Pt had a large bowel movement following treatment in the ED resulting in resolution of symptoms. Will discharge with a prescription for Colace and refill his pain medications. Will additionally recommend that he follow up with ENT. LABORATORY TESTS:  Recent Results (from the past 12 hour(s))   CBC WITH AUTOMATED DIFF    Collection Time: 01/21/17  1:09 PM   Result Value Ref Range    WBC 11.2 (H) 4.1 - 11.1 K/uL    RBC 3.10 (L) 4.10 - 5.70 M/uL    HGB 10.0 (L) 12.1 - 17.0 g/dL    HCT 30.4 (L) 36.6 - 50.3 %    MCV 98.1 80.0 - 99.0 FL    MCH 32.3 26.0 - 34.0 PG    MCHC 32.9 30.0 - 36.5 g/dL    RDW 12.3 11.5 - 14.5 %    PLATELET 415 656 - 184 K/uL    NEUTROPHILS 59 32 - 75 %    LYMPHOCYTES 30 12 - 49 %    MONOCYTES 11 5 - 13 %    EOSINOPHILS 0 0 - 7 %    BASOPHILS 0 0 - 1 %    ABS. NEUTROPHILS 6.6 1.8 - 8.0 K/UL    ABS. LYMPHOCYTES 3.3 0.8 - 3.5 K/UL    ABS. MONOCYTES 1.2 (H) 0.0 - 1.0 K/UL    ABS. EOSINOPHILS 0.0 0.0 - 0.4 K/UL    ABS.  BASOPHILS 0.0 0.0 - 0.1 K/UL   METABOLIC PANEL, COMPREHENSIVE    Collection Time: 01/21/17  1:09 PM   Result Value Ref Range    Sodium 135 (L) 136 - 145 mmol/L    Potassium 4.3 3.5 - 5.1 mmol/L    Chloride 96 (L) 97 - 108 mmol/L    CO2 31 21 - 32 mmol/L    Anion gap 8 5 - 15 mmol/L    Glucose 93 65 - 100 mg/dL    BUN 17 6 - 20 MG/DL    Creatinine 1.04 0.70 - 1.30 MG/DL    BUN/Creatinine ratio 16 12 - 20      GFR est AA >60 >60 ml/min/1.73m2    GFR est non-AA >60 >60 ml/min/1.73m2    Calcium 9.0 8.5 - 10.1 MG/DL    Bilirubin, total 0.4 0.2 - 1.0 MG/DL    ALT 12 12 - 78 U/L    AST 13 (L) 15 - 37 U/L    Alk. phosphatase 102 45 - 117 U/L    Protein, total 7.9 6.4 - 8.2 g/dL    Albumin 2.8 (L) 3.5 - 5.0 g/dL    Globulin 5.1 (H) 2.0 - 4.0 g/dL    A-G Ratio 0.5 (L) 1.1 - 2.2     TYPE & SCREEN    Collection Time: 01/21/17  1:09 PM   Result Value Ref Range    Crossmatch Expiration 01/24/2017     ABO/Rh(D) O POSITIVE     Antibody screen NEG        IMAGING RESULTS:  CXR Results  (Last 48 hours)               01/21/17 1333  XR ABD ACUTE W 1 V CHEST Final result    Impression:  IMPRESSION: Parenchymal opacity in the right middle lobe is concerning for   neoplasm. Correlation with chest CT recommended. Moderate fecal stasis without   evidence of obstruction. Narrative:  EXAM:  XR ABD ACUTE W 1 V CHEST       INDICATION:  Abdominal Pain, constipation for 2 days       COMPARISON: None. FINDINGS: The upright chest radiograph demonstrates a 3.7 x 6.3 cm parenchymal   abnormality in the right middle lung field. The left lung is clear. Supine and upright views of the abdomen demonstrate a nonobstructive bowel gas   pattern. There is no free intraperitoneal air. PEG tube projects over the   gastric bubble. The bones are within normal limits. Vascular calcification is   noted. Mild fecal stasis is noted. MEDICATIONS GIVEN:  Medications   sodium chloride 0.9 % bolus infusion 1,000 mL (0 mL IntraVENous IV Completed 1/21/17 5684)       IMPRESSION:  1. Constipation, unspecified constipation type    2. [facility]    3. Malignant neoplasm metastatic to lung, unspecified laterality ([facility]        PLAN:  1. Current Discharge Medication List      START taking these medications    Details   docusate (COLACE) 60 mg/15 mL syrup 12.5 mL by Per G Tube route two (2) times a day. Qty: 473 mL, Refills: 0         CONTINUE these medications which have CHANGED    Details   HYDROcodone-acetaminophen (NORCO) 5-325 mg per tablet Take 1 Tab by mouth every four (4) hours as needed for Pain. Qty: 20 Tab, Refills: 0           2. Follow-up Information     Follow up With Details Comments Contact Info    Elsa Starks NP Schedule an appointment as soon as possible for a visit in 2 days  [address] [facility]  [phone]          Return to ED if worse     Discharge Note:  4:06 PM  The patient has been re-evaluated and is ready for discharge. Reviewed available results with patient. Counseled patient on diagnosis and care plan. Patient has expressed understanding, and all questions have been answered. Patient agrees with plan and agrees to follow up as recommended, or return to the ED if their symptoms worsen. Discharge instructions have been provided and explained to the patient, along with reasons to return to the ED. Attestation: This note is prepared by Ashley Avery, acting as Scribe for Torsten Fischer MD.    Torsten Fischer MD: The scribe's documentation has been prepared under my direction and personally reviewed by me in its entirety. I confirm that the note above accurately reflects all work, treatment, procedures, and medical decision making performed by me.

## 2017-02-19 NOTE — DISCHARGE INSTRUCTIONS
Pneumonia: Care Instructions  Your Care Instructions    Pneumonia is an infection of the lungs. Most cases are caused by infections from bacteria or viruses. Pneumonia may be mild or very severe. If it is caused by bacteria, you will be treated with antibiotics. It may take a few weeks to a few months to recover fully from pneumonia, depending on how sick you were and whether your overall health is good. Follow-up care is a key part of your treatment and safety. Be sure to make and go to all appointments, and call your doctor if you are having problems. Its also a good idea to know your test results and keep a list of the medicines you take. How can you care for yourself at home? · Take your antibiotics exactly as directed. Do not stop taking the medicine just because you are feeling better. You need to take the full course of antibiotics. · Take your medicines exactly as prescribed. Call your doctor if you think you are having a problem with your medicine. · Get plenty of rest and sleep. You may feel weak and tired for a while, but your energy level will improve with time. · To prevent dehydration, drink plenty of fluids, enough so that your urine is light yellow or clear like water. Choose water and other caffeine-free clear liquids until you feel better. If you have kidney, heart, or liver disease and have to limit fluids, talk with your doctor before you increase the amount of fluids you drink. · Take care of your cough so you can rest. A cough that brings up mucus from your lungs is common with pneumonia. It is one way your body gets rid of the infection. But if coughing keeps you from resting or causes severe fatigue and chest-wall pain, talk to your doctor. He or she may suggest that you take a medicine to reduce the cough. · Use a vaporizer or humidifier to add moisture to your bedroom. Follow the directions for cleaning the machine. · Do not smoke or allow others to smoke around you.  Smoke will make your cough last longer. If you need help quitting, talk to your doctor about stop-smoking programs and medicines. These can increase your chances of quitting for good. · Take an over-the-counter pain medicine, such as acetaminophen (Tylenol), ibuprofen (Advil, Motrin), or naproxen (Aleve). Read and follow all instructions on the label. · Do not take two or more pain medicines at the same time unless the doctor told you to. Many pain medicines have acetaminophen, which is Tylenol. Too much acetaminophen (Tylenol) can be harmful. · If you were given a spirometer to measure how well your lungs are working, use it as instructed. This can help your doctor tell how your recovery is going. · To prevent pneumonia in the future, talk to your doctor about getting a flu vaccine (once a year) and a pneumococcal vaccine (one time only for most people). When should you call for help? Call 911 anytime you think you may need emergency care. For example, call if:  · You have severe trouble breathing. Call your doctor now or seek immediate medical care if:  · You cough up dark brown or bloody mucus (sputum). · You have new or worse trouble breathing. · You are dizzy or lightheaded, or you feel like you may faint. Watch closely for changes in your health, and be sure to contact your doctor if:  · You have a new or higher fever. · You are coughing more deeply or more often. · You are not getting better after 2 days (48 hours). · You do not get better as expected. Where can you learn more? Go to http://leandra-jag.info/. Enter [id number] in the search box to learn more about \"Pneumonia: Care Instructions. \"  Current as of: May 23, 2016  Content Version: 11.1  © 3035-2970 [employer]. Care instructions adapted under license by [employer] (which disclaims liability or warranty for this information).  If you have questions about a medical condition or this instruction, always ask your healthcare professional. [place] [address] any warranty or liability for your use of this information.

## 2017-02-19 NOTE — ED PROVIDER NOTES
HPI Comments: Surya Machado, 73 y.o. Male with PMHx of HIV and throat cancer presents via EMS to ED [facility] ED with cc of 10/10 right-sided rib pain radiating to chest x early this morning. He also reports SOB due to pain and cough. Pt is independently ambulatory at baseline. He denies any injuries to chest. Pain is exacerbated with movement. He has not taken any pain medications. He denies any fevers, chills, nausea, vomiting, diarrhea, or dysuria. PCP: PROVIDER UNKNOWN    Social history significant for: + Tobacco, + EtOH, + Illicit Drug Use (marijuana)    There are no other complaints, changes, or physical findings at this time. Written by JON Phipps, as dictated by Lottie Gould M.D. The history is provided by the patient. No  was used. Past Medical History:   Diagnosis Date    Cancer ([facility] [address]      Throat    HIV (human immunodeficiency virus infection) ([facility] [address]        Past Surgical History:   Procedure Laterality Date    Hx heent       Throat cancer removal         History reviewed. No pertinent family history. Social History     Social History    Marital status:      Spouse name: N/A    Number of children: N/A    Years of education: N/A     Occupational History    Not on file. Social History Main Topics    Smoking status: Current Every Day Smoker    Smokeless tobacco: Not on file    Alcohol use Yes    Drug use: Yes     Special: Marijuana    Sexual activity: Not on file     Other Topics Concern    Not on file     Social History Narrative         ALLERGIES: Review of patient's allergies indicates no known allergies. Review of Systems   Constitutional: Negative for chills and fever. Respiratory: Positive for cough and shortness of breath. Cardiovascular: Positive for chest pain (R ribs). Gastrointestinal: Negative for constipation, diarrhea, nausea and vomiting. Neurological: Negative for weakness and numbness.    All other systems reviewed and are negative. Vitals:    02/19/17 0233   BP: 113/76   Pulse: 88   Resp: 18   Temp: 97.6 °F (36.4 °C)   SpO2: 96%   Weight: 52.2 kg (115 lb)   Height: 5' 7\" (1.702 m)            Physical Exam   Constitutional: He is oriented to person, place, and time. He appears well-developed. He appears cachectic. Thin male    HENT:   Head: Normocephalic and atraumatic. Eyes: Conjunctivae and EOM are normal.   Neck: Normal range of motion. Neck supple. Cardiovascular: Normal rate and regular rhythm. Pulmonary/Chest: Effort normal and breath sounds normal. No respiratory distress. Abdominal: Soft. He exhibits no distension. There is no tenderness. G  Tube in place without signs of infection   Musculoskeletal: Normal range of motion. R CVA tenderness  Exquisite tenderness over right lower anterior ribs and left posterior ribs    Neurological: He is alert and oriented to person, place, and time. Skin: Skin is warm and dry. Psychiatric: He has a normal mood and affect. Nursing note and vitals reviewed. MDM  Number of Diagnoses or Management Options  GRIFFIN (acute kidney injury) ([facility] [address]):   CAP (community acquired pneumonia):   Diagnosis management comments: Patient presents with CP. DDx:  ACS, Aortic dissection, PNA, PE, PTX, pericarditis, myocarditis, GERD, costochondritis, anxiety. Most likely musculoskeletal  given the HPI, tenderness and Physical exam. Will obtain labs, CXR, EKG. Amount and/or Complexity of Data Reviewed  Clinical lab tests: ordered and reviewed  Tests in the radiology section of CPT®: ordered and reviewed  Tests in the medicine section of CPT®: ordered and reviewed  Review and summarize past medical records: yes  Independent visualization of images, tracings, or specimens: yes    Patient Progress  Patient progress: stable    ED Course       Procedures    EKG interpretation: (Preliminary) 02:24  Rhythm: normal sinus rhythm; and regular .  Rate (approx.): 86; Axis: normal; AL interval: normal; QRS interval: normal ; ST/T wave: normal;  Other findings: normal.  Written by Solitario Mcdonald ED Scribe, as dictated by Marshall Estrella M.D.    4:17 AM   Informed pt about slight GRIFFIN and PNA per CXR with elevated white count. Will treat with Levaquin PO and f/u with PCP. His vitals are within normal limits and there is no indication for admission  Written by Solitario Mcdonald ED Scribe, as dictated by Marshall Estrella M.D.      LABORATORY TESTS:  Recent Results (from the past 12 hour(s))   EKG, 12 LEAD, INITIAL    Collection Time: 02/19/17  2:24 AM   Result Value Ref Range    Ventricular Rate 86 BPM    Atrial Rate 86 BPM    P-R Interval 134 ms    QRS Duration 80 ms    Q-T Interval 362 ms    QTC Calculation (Bezet) 433 ms    Calculated P Axis 71 degrees    Calculated R Axis 74 degrees    Calculated T Axis 77 degrees    Diagnosis       Normal sinus rhythm  Normal ECG  No previous ECGs available     METABOLIC PANEL, COMPREHENSIVE    Collection Time: 02/19/17  3:35 AM   Result Value Ref Range    Sodium 137 136 - 145 mmol/L    Potassium 4.2 3.5 - 5.1 mmol/L    Chloride 101 97 - 108 mmol/L    CO2 26 21 - 32 mmol/L    Anion gap 10 5 - 15 mmol/L    Glucose 96 65 - 100 mg/dL    BUN 19 6 - 20 MG/DL    Creatinine 1.47 (H) 0.70 - 1.30 MG/DL    BUN/Creatinine ratio 13 12 - 20      GFR est AA 58 (L) >60 ml/min/1.73m2    GFR est non-AA 47 (L) >60 ml/min/1.73m2    Calcium 8.8 8.5 - 10.1 MG/DL    Bilirubin, total 0.5 0.2 - 1.0 MG/DL    ALT (SGPT) 10 (L) 12 - 78 U/L    AST (SGOT) 12 (L) 15 - 37 U/L    Alk.  phosphatase 100 45 - 117 U/L    Protein, total 8.2 6.4 - 8.2 g/dL    Albumin 2.6 (L) 3.5 - 5.0 g/dL    Globulin 5.6 (H) 2.0 - 4.0 g/dL    A-G Ratio 0.5 (L) 1.1 - 2.2     URINALYSIS W/ RFLX MICROSCOPIC    Collection Time: 02/19/17  3:35 AM   Result Value Ref Range    Color YELLOW/STRAW      Appearance CLEAR CLEAR      Specific gravity 1.012 1.003 - 1.030      pH (UA) 7.0 5.0 - 8.0      Protein NEGATIVE NEG mg/dL    Glucose NEGATIVE  NEG mg/dL    Ketone NEGATIVE  NEG mg/dL    Bilirubin NEGATIVE  NEG      Blood NEGATIVE  NEG      Urobilinogen 1.0 0.2 - 1.0 EU/dL    Nitrites NEGATIVE  NEG      Leukocyte Esterase SMALL (A) NEG      WBC 0-4 0 - 4 /hpf    RBC 0-5 0 - 5 /hpf    Epithelial cells FEW FEW /lpf    Bacteria 1+ (A) NEG /hpf   CBC WITH AUTOMATED DIFF    Collection Time: 02/19/17  3:35 AM   Result Value Ref Range    WBC 16.3 (H) 4.1 - 11.1 K/uL    RBC 2.85 (L) 4.10 - 5.70 M/uL    HGB 8.9 (L) 12.1 - 17.0 g/dL    HCT 27.4 (L) 36.6 - 50.3 %    MCV 96.1 80.0 - 99.0 FL    MCH 31.2 26.0 - 34.0 PG    MCHC 32.5 30.0 - 36.5 g/dL    RDW 13.3 11.5 - 14.5 %    PLATELET 644 (H) 221 - 400 K/uL    NEUTROPHILS 69 32 - 75 %    LYMPHOCYTES 27 12 - 49 %    MONOCYTES 3 (L) 5 - 13 %    EOSINOPHILS 1 0 - 7 %    BASOPHILS 0 0 - 1 %    ABS. NEUTROPHILS 11.3 (H) 1.8 - 8.0 K/UL    ABS. LYMPHOCYTES 4.4 (H) 0.8 - 3.5 K/UL    ABS. MONOCYTES 0.4 0.0 - 1.0 K/UL    ABS. EOSINOPHILS 0.2 0.0 - 0.4 K/UL    ABS. BASOPHILS 0.0 0.0 - 0.1 K/UL   TROPONIN I    Collection Time: 02/19/17  3:35 AM   Result Value Ref Range    Troponin-I, Qt. <0.04 <0.05 ng/mL       IMAGING RESULTS:  XR CHEST PORT   Final Result   Chest portable AP     History: Rib pain.     Comparison: 1/21/2017     Findings: There is a 2.9 x 5.0 cm area of consolidation in the mid right lung. This has decreased in size in the interval. There is groundglass opacity  surrounding this. No new focal consolidation, pleural effusion, or pneumothorax. The cardiomediastinal silhouette is unremarkable. The visualized osseous  structures are unremarkable.     IMPRESSION  Impression:  Mild decrease in the previously seen area of consolidation in the mid right lung  with surrounding groundglass opacity.  This could represent pneumonia although  malignancy cannot be excluded.                MEDICATIONS GIVEN:  Medications   levoFLOXacin (LEVAQUIN) tablet 500 mg (not administered)   sodium chloride 0.9 % bolus infusion 1,000 mL (not administered)   albuterol-ipratropium (DUO-NEB) 2.5 MG-0.5 MG/3 ML (3 mL Nebulization Given 2/19/17 0300)   ketorolac (TORADOL) injection 15 mg (15 mg IntraVENous Given 2/19/17 0334)       IMPRESSION:  1. CAP (community acquired pneumonia)    2. GRIFFIN (acute kidney injury) ([facility] [address]        PLAN:  1. Current Discharge Medication List      START taking these medications    Details   levoFLOXacin (LEVAQUIN) 500 mg tablet Take 1 Tab by mouth daily. Qty: 7 Tab, Refills: 0         CONTINUE these medications which have NOT CHANGED    Details   docusate (COLACE) 60 mg/15 mL syrup 12.5 mL by Per G Tube route two (2) times a day. Qty: 473 mL, Refills: 0      HYDROcodone-acetaminophen (NORCO) 5-325 mg per tablet Take 1 Tab by mouth every four (4) hours as needed for Pain. Qty: 20 Tab, Refills: 0           2. Follow-up Information     Follow up With Details Comments Contact Info    Provider Unknown  Abotu repeat kidney tests and about your pneumonia Patient not available to ask          Return to ED if worse     Discharge Note:  4:35 AM  The pt is ready for discharge. The pt's signs, symptoms, diagnosis, and discharge instructions have been discussed and pt has conveyed their understanding. The pt is to follow up as recommended or return to ER should their symptoms worsen. Plan has been discussed and pt is in agreement. This note is prepared by Marlin Mayers, acting as a Scribe for Ariana Granado M.D. Ariana Granado M.D: The scribe's documentation has been prepared under my direction and personally reviewed by me in its entirety. I confirm that the notes above accurately reflects all work, treatment, procedures, and medical decision making performed by me.

## 2017-02-19 NOTE — ED NOTES
Pt had family friend show up to pick him up after transportation had been arranged. Cab company called to cancel transport.

## 2017-02-19 NOTE — ED NOTES
Discharged by Dr. Margaret Shoemaker. All pt questions answered by MD and RN. LDA removed prior to discharge, site is [facility]. Pt ambulatory with a steady gait at time of discharge. Pt declined wheelchair.

## 2017-08-29 NOTE — ED NOTES
Dr Coby Zee reviewed discharge instructions with the patient. The patient verbalized understanding. Patient discharged home in stable condition, ambulatory with family. Patient discharged with discharge papers and prescriptions in hand. Patient awake and alert, stable gait.

## 2017-08-29 NOTE — ED NOTES
Patient states he is having pain in the throat, boat sides of the neck, and the abdomen. States it all started last night. Patient denies injury. Patient has a trach and a feeding tube, does not swallow food or drink.

## 2017-08-29 NOTE — DISCHARGE INSTRUCTIONS
Sore Throat: Care Instructions  Your Care Instructions    Infection by bacteria or a virus causes most sore throats. Cigarette smoke, dry air, air pollution, allergies, and yelling can also cause a sore throat. Sore throats can be painful and annoying. Fortunately, most sore throats go away on their own. If you have a bacterial infection, your doctor may prescribe antibiotics. Follow-up care is a key part of your treatment and safety. Be sure to make and go to all appointments, and call your doctor if you are having problems. It's also a good idea to know your test results and keep a list of the medicines you take. How can you care for yourself at home? · If your doctor prescribed antibiotics, take them as directed. Do not stop taking them just because you feel better. You need to take the full course of antibiotics. · Gargle with warm salt water once an hour to help reduce swelling and relieve discomfort. Use 1 teaspoon of salt mixed in 1 cup of warm water. · Take an over-the-counter pain medicine, such as acetaminophen (Tylenol), ibuprofen (Advil, Motrin), or naproxen (Aleve). Read and follow all instructions on the label. · Be careful when taking over-the-counter cold or flu medicines and Tylenol at the same time. Many of these medicines have acetaminophen, which is Tylenol. Read the labels to make sure that you are not taking more than the recommended dose. Too much acetaminophen (Tylenol) can be harmful. · Drink plenty of fluids. Fluids may help soothe an irritated throat. Hot fluids, such as tea or soup, may help decrease throat pain. · Use over-the-counter throat lozenges to soothe pain. Regular cough drops or hard candy may also help. These should not be given to young children because of the risk of choking. · Do not smoke or allow others to smoke around you. If you need help quitting, talk to your doctor about stop-smoking programs and medicines.  These can increase your chances of quitting for good. · Use a vaporizer or humidifier to add moisture to your bedroom. Follow the directions for cleaning the machine. When should you call for help? Call your doctor now or seek immediate medical care if:  · You have new or worse trouble swallowing. · Your sore throat gets much worse on one side. Watch closely for changes in your health, and be sure to contact your doctor if you do not get better as expected. Where can you learn more? Go to http://leandra-jag.info/. Enter [phone] in the search box to learn more about \"Sore Throat: Care Instructions. \"  Current as of: July 29, 2016  Content Version: 11.3  © 1785-6289 [employer]. Care instructions adapted under license by [employer] (which disclaims liability or warranty for this information). If you have questions about a medical condition or this instruction, always ask your healthcare professional. [address] any warranty or liability for your use of this information.

## 2017-08-29 NOTE — ED PROVIDER NOTES
HPI Comments: Rafa Machado is a 73 y.o. male with PMhx significant for CA and HIV who presents ambulatory to [facility] ED with cc of constant sore throat and left sided abd pain since last night. He describes that his abd pain surrounds his PEG tube. However he denies any difficulty with food entering the tube. He reports that he has a trach, noting he is unsure if this is related to his sore throat. He denies any known injury. He specifically denies any fevers, chills, nausea, vomiting, chest pain, shortness of breath, headache, rash, diarrhea, sweating or weight loss. SHx: (-) tobacco use; (-) EtOH use; (+) illicit drug use    PCP: PROVIDER UNKNOWN    There are no other complaints, changes or physical findings at this time. Written by JON Villanueva, as dictated by Mike Goldberg. Karishma Plasencia MD.          The history is provided by the patient. No  was used. Past Medical History:   Diagnosis Date    Cancer ([facility] [address]     Throat    HIV (human immunodeficiency virus infection) ([facility] [address]        Past Surgical History:   Procedure Laterality Date    HX HEENT      Throat cancer removal         History reviewed. No pertinent family history. Social History     Social History    Marital status:      Spouse name: N/A    Number of children: N/A    Years of education: N/A     Occupational History    Not on file. Social History Main Topics    Smoking status: Former Smoker    Smokeless tobacco: Never Used    Alcohol use No    Drug use: Yes     Special: Marijuana    Sexual activity: Not on file     Other Topics Concern    Not on file     Social History Narrative         ALLERGIES: Review of patient's allergies indicates no known allergies. Review of Systems   Constitutional: Negative. Negative for activity change, appetite change, chills, fatigue, fever and unexpected weight change. HENT: Positive for sore throat.  Negative for congestion, hearing loss, rhinorrhea, sneezing and voice change. Eyes: Negative. Negative for pain and visual disturbance. Respiratory: Negative. Negative for apnea, cough, choking, chest tightness and shortness of breath. Cardiovascular: Negative. Negative for chest pain and palpitations. Gastrointestinal: Positive for abdominal pain. Negative for abdominal distention, blood in stool, diarrhea, nausea and vomiting. Genitourinary: Negative. Negative for difficulty urinating, flank pain, frequency and urgency. No discharge   Musculoskeletal: Negative. Negative for arthralgias, back pain, myalgias and neck stiffness. Skin: Negative. Negative for color change and rash. Neurological: Negative. Negative for dizziness, seizures, syncope, speech difficulty, weakness, numbness and headaches. Hematological: Negative for adenopathy. Psychiatric/Behavioral: Negative. Negative for agitation, behavioral problems, dysphoric mood and suicidal ideas. The patient is not nervous/anxious. All other systems reviewed and are negative. Vitals:    08/29/17 1441 08/29/17 1600 08/29/17 1830   BP: 133/77 136/76 148/85   Resp:  18    Temp: 97.3 °F (36.3 °C)     SpO2: 100% 100% 100%            Physical Exam   Constitutional: He is oriented to person, place, and time. He appears well-developed and well-nourished. No distress. HENT:   Head: Normocephalic and atraumatic. Mouth/Throat: Oropharynx is clear and moist. No oropharyngeal exudate. Erythematous tonsils with purulent drainage. Eyes: Conjunctivae and EOM are normal. Pupils are equal, round, and reactive to light. Right eye exhibits no discharge. Left eye exhibits no discharge. Neck: Normal range of motion. Neck supple. Cardiovascular: Normal rate, regular rhythm and intact distal pulses. Exam reveals no gallop and no friction rub. No murmur heard. Pulmonary/Chest: Effort normal and breath sounds normal. No respiratory distress. He has no wheezes. He has no rales.  He exhibits no tenderness. Abdominal: Soft. Bowel sounds are normal. He exhibits no distension and no mass. There is no tenderness. There is no rebound and no guarding. PEG tube in left epigastrium is clean, dry and intact. Musculoskeletal: Normal range of motion. He exhibits no edema. Lymphadenopathy:     He has no cervical adenopathy. Neurological: He is alert and oriented to person, place, and time. No cranial nerve deficit. Coordination normal.   Skin: Skin is warm and dry. No rash noted. No erythema. Psychiatric: He has a normal mood and affect. Nursing note and vitals reviewed. MDM  Number of Diagnoses or Management Options  Diagnosis management comments: DDx: dehydration, electrolyte abnormality, strep       Amount and/or Complexity of Data Reviewed  Clinical lab tests: ordered and reviewed  Review and summarize past medical records: yes    Patient Progress  Patient progress: stable    ED Course       Procedures  Chief Complaint   Patient presents with    Neck Pain     pt has trach and PEG tube that are causing him pain since last night. 4:17 PM  The patients presenting problems have been discussed, and they are in agreement with the care plan formulated and outlined with them. I have encouraged them to ask questions as they arise throughout their visit.     MEDICATIONS GIVEN:  Medications   sodium chloride 0.9 % bolus infusion 1,000 mL (0 mL IntraVENous IV Completed 8/29/17 1902)   morphine injection 4 mg (4 mg IntraVENous Given 8/29/17 1645)       LABS REVIEWED:  Recent Results (from the past 24 hour(s))   METABOLIC PANEL, COMPREHENSIVE    Collection Time: 08/29/17  3:41 PM   Result Value Ref Range    Sodium 129 (L) 136 - 145 mmol/L    Potassium 4.2 3.5 - 5.1 mmol/L    Chloride 94 (L) 97 - 108 mmol/L    CO2 30 21 - 32 mmol/L    Anion gap 5 5 - 15 mmol/L    Glucose 73 65 - 100 mg/dL    BUN 15 6 - 20 MG/DL    Creatinine 0.86 0.70 - 1.30 MG/DL    BUN/Creatinine ratio 17 12 - 20      GFR est AA >60 >60 ml/min/1.73m2    GFR est non-AA >60 >60 ml/min/1.73m2    Calcium 8.3 (L) 8.5 - 10.1 MG/DL    Bilirubin, total 0.2 0.2 - 1.0 MG/DL    ALT (SGPT) 25 12 - 78 U/L    AST (SGOT) 21 15 - 37 U/L    Alk. phosphatase 89 45 - 117 U/L    Protein, total 7.8 6.4 - 8.2 g/dL    Albumin 2.6 (L) 3.5 - 5.0 g/dL    Globulin 5.2 (H) 2.0 - 4.0 g/dL    A-G Ratio 0.5 (L) 1.1 - 2.2     CBC WITH AUTOMATED DIFF    Collection Time: 08/29/17  3:41 PM   Result Value Ref Range    WBC 8.2 4.1 - 11.1 K/uL    RBC 3.51 (L) 4.10 - 5.70 M/uL    HGB 10.7 (L) 12.1 - 17.0 g/dL    HCT 32.1 (L) 36.6 - 50.3 %    MCV 91.5 80.0 - 99.0 FL    MCH 30.5 26.0 - 34.0 PG    MCHC 33.3 30.0 - 36.5 g/dL    RDW 16.9 (H) 11.5 - 14.5 %    PLATELET 718 435 - 037 K/uL    NEUTROPHILS 35 32 - 75 %    LYMPHOCYTES 56 (H) 12 - 49 %    MONOCYTES 7 5 - 13 %    EOSINOPHILS 2 0 - 7 %    BASOPHILS 0 0 - 1 %    ABS. NEUTROPHILS 2.9 1.8 - 8.0 K/UL    ABS. LYMPHOCYTES 4.6 (H) 0.8 - 3.5 K/UL    ABS. MONOCYTES 0.6 0.0 - 1.0 K/UL    ABS. EOSINOPHILS 0.2 0.0 - 0.4 K/UL    ABS. BASOPHILS 0.0 0.0 - 0.1 K/UL   STREP AG SCREEN, GROUP A    Collection Time: 08/29/17  3:41 PM   Result Value Ref Range    Group A Strep Ag ID NEGATIVE  NEG     LACTIC ACID    Collection Time: 08/29/17  3:41 PM   Result Value Ref Range    Lactic acid 0.7 0.4 - 2.0 MMOL/L       VITAL SIGNS:  Patient Vitals for the past 12 hrs:   Temp Resp BP SpO2   08/29/17 1830 - - 148/85 100 %   08/29/17 1600 - 18 136/76 100 %   08/29/17 1441 97.3 °F (36.3 °C) - 133/77 100 %         DIAGNOSIS:    1.  Acute pharyngitis, unspecified etiology        PLAN:  Follow-up Information     Follow up With Details Comments Contact Info    Provider Unknown   Patient not available to ask      [facility] EMERGENCY DEPT Call As needed, If symptoms worsen [address] [place] [address]  [phone]        Discharge Medication List as of 8/29/2017  6:29 PM      START taking these medications    Details   aluminum-magnesium hydroxide 200-200 mg/5 mL susp 5 mL, diphenhydrAMINE 12.5 mg/5 mL liqd 12.5 mg, lidocaine 2 % soln 5 mL 5 mL by Swish and Spit route two (2) times a day. Magic mouth wash   Maalox  Lidocaine 2% viscous   Diphenhydramine oral solution     Pharmacy to mix equal portions of ingredients to a total volume as indicated in the dispense amount. , Print, Disp-30 mL,  R-0         CONTINUE these medications which have NOT CHANGED    Details   levoFLOXacin (LEVAQUIN) 500 mg tablet Take 1 Tab by mouth daily. , Print, Disp-7 Tab, R-0      docusate (COLACE) 60 mg/15 mL syrup 12.5 mL by Per G Tube route two (2) times a day., Print, Disp-473 mL, R-0      HYDROcodone-acetaminophen (NORCO) 5-325 mg per tablet Take 1 Tab by mouth every four (4) hours as needed for Pain., Print, Disp-20 Tab, R-0               ED COURSE: The patients hospital course has been uncomplicated. 6:44 PM  Peng Machado's  results have been reviewed with him. He has been counseled regarding his diagnosis. He verbally conveys understanding and agreement of the signs, symptoms, diagnosis, treatment and prognosis and additionally agrees to follow up as recommended with Dr. Rodriguez Navarrete in 24 - 48 hours. He also agrees with the care-plan and conveys that all of his questions have been answered. I have also put together some discharge instructions for him that include: 1) educational information regarding their diagnosis, 2) how to care for their diagnosis at home, as well a 3) list of reasons why they would want to return to the ED prior to their follow-up appointment, should their condition change. Attestation: This is note is prepared by Michael Zuluaga, acting as Scribe for [employer] Janis Grijalva, [address] Janis Grijalva MD The scribe's documentation has been prepared under my direction and personally reviewed by me in its entirety.  I confirm that the note above accurately reflects all work, treatment, procedures, and medical decision making performed by me.

## 2017-09-04 NOTE — ED NOTES
Pt resting in stretcher. Call bell within reach. Updated on plan of care. Medicated for pain. Awaiting reeval by MD.     No other complaints voiced at this time.

## 2017-09-04 NOTE — ED NOTES
Pt returned to room from CT. Placed back on monitor. Attempted to medicate with tylenol. Refused, reports is allergic, \"makes my stomach hurt. \"     When questioned on what is taken normally for pain, \"I usually take percocet. \"     Spoke with Dr. Hernesto Lima about pt request.    Provided pt with mouth swabs, was concerned about dry mouth.

## 2017-09-04 NOTE — ED NOTES
Pt resting in stretcher. Call bell within reach. Updated on plan of care. IV inserted. Blood draw and sent to lab. Awaiting CT scan. Is requesting something for pain, spoke with Dr. Rebecca Schmitz about medication. No other complaints voiced at this time.

## 2017-09-04 NOTE — ED PROVIDER NOTES
HPI Comments: Fabián Machado is a 73 y.o. male with PMhx significant for HIV and throat cancer who presents ambulatory to the ED with c/o intermittent swelling and numbness to the left side of his face x 1 week. He notes associated difficultly and pain with swallowing. The pt is also complaining of abdominal pain and notes it has been a while since his feeding tube has been changed. Of note, pt has a trach, eats nothing PO, and that part of his tounge has been removed because of his throat cancer. He reports his last dose of radiation was last week, and he normally does get slight swelling afterwards, but reports this swelling and pain is different. He denies any pain with swallowing currently in the ED. The pt denies any fever, chills, or any recent new medications. PCP: PROVIDER UNKNOWN    Social history significant for: - Tobacco, - EtOH, - Illicit Drug Use    There are no other complaints, changes, or physical findings at this time. The history is provided by the patient. No  was used. Past Medical History:   Diagnosis Date    Cancer ([facility] [address]     Throat    HIV (human immunodeficiency virus infection) ([facility] [address]        Past Surgical History:   Procedure Laterality Date    HX GI      feeding tube    HX HEENT      throat cancer removed, trach         History reviewed. No pertinent family history. Social History     Social History    Marital status:      Spouse name: N/A    Number of children: N/A    Years of education: N/A     Occupational History    Not on file. Social History Main Topics    Smoking status: Former Smoker    Smokeless tobacco: Never Used    Alcohol use No    Drug use: No    Sexual activity: Not on file     Other Topics Concern    Not on file     Social History Narrative         ALLERGIES: Review of patient's allergies indicates no known allergies. Review of Systems   Constitutional: Negative for chills and fever. HENT: Negative.     Eyes: Negative. Respiratory: Negative for cough, chest tightness and shortness of breath. Cardiovascular: Negative for chest pain and leg swelling. Gastrointestinal: Positive for abdominal pain. Negative for diarrhea, nausea and vomiting. Endocrine: Negative. Genitourinary: Negative for difficulty urinating and dysuria. Musculoskeletal: Positive for myalgias (throat and face) and neck pain. Skin: Negative. Neurological: Positive for numbness (L face). Psychiatric/Behavioral: Negative. All other systems reviewed and are negative. Vitals:    09/03/17 2104 09/03/17 2130 09/03/17 2215 09/03/17 2230   BP:  (!) 160/11 155/68 (!) 151/32   Pulse:       Resp:       Temp:       SpO2: 100% 100% 100% 100%   Weight:       Height:                Physical Exam   Constitutional: He is oriented to person, place, and time. He appears well-developed and well-nourished. No distress. HENT:   Head: Normocephalic and atraumatic. Nose: Nose normal.   Mouth/Throat: Uvula is midline. Mucous membranes are not pale, not dry and not cyanotic. He does not have dentures. Dental caries present. No uvula swelling. No oropharyngeal exudate, posterior oropharyngeal erythema or tonsillar abscesses. Eyes: Conjunctivae and EOM are normal. Pupils are equal, round, and reactive to light. Neck: No JVD present. Muscular tenderness present. No spinous process tenderness present. No edema and no erythema present. Trach with speaking valve in place   Cardiovascular: Normal rate, regular rhythm, normal heart sounds and intact distal pulses. Exam reveals no friction rub. No murmur heard. Pulmonary/Chest: Effort normal and breath sounds normal. No stridor. No respiratory distress. He has no wheezes. He has no rales. Abdominal: Soft. Bowel sounds are normal. He exhibits no distension and no mass. There is no tenderness. There is no rebound and no guarding.    PEG tube in place, surrounding skin intact and without erythema or drainage   Musculoskeletal: Normal range of motion. He exhibits no tenderness. Neurological: He is alert and oriented to person, place, and time. No cranial nerve deficit. Skin: Skin is warm and dry. No rash noted. He is not diaphoretic. Psychiatric: He has a normal mood and affect. His speech is normal and behavior is normal. Judgment and thought content normal. Cognition and memory are normal.   Nursing note and vitals reviewed. 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[facility]):   Neck pain:   Diagnosis management comments: DDX:  Metastatic mass, PTA, pharyngitis    Plan:  Labs, ct, analgesic    Impression:  pharyngitis         Amount and/or Complexity of Data Reviewed  Clinical lab tests: ordered and reviewed  Tests in the radiology section of CPT®: reviewed and ordered      ED Course       Procedures   I reviewed our electronic medical record system for any past medical records that were available that may contribute to the patients current condition, the nursing notes and and vital signs from today's visit    Nursing notes will be reviewed as they become available in realtime while the pt has been in the ED. Ashu Cross MD    9:06 AM  Pt's rate is 78 with a 16 rhythm as noted on Cardiac monitor. SpO2 is 100, on (RA/ L)    I personally reviewed pt's imaging. Official read by radiology listed below. Ashu Cross MD      9:55 PM   Progress note:  Pt is requesting medication for his headache. 10:20 PM   Progress note:  Pt refuses Tylenol for his headache, complains it gives him an upset stomach despite reporting taking percocet regularly at home for pain. Progress note:  Pt noted to be feeling better after toradol, ready for discharge. Discussed lab and imaging findings with pt and/or family. Will follow up with [facility] as instructed. All questions have been answered, pt voiced understanding and agreement with plan. If narcotics were prescribed, pt was advised not to drive or operate heavy machinery. If abx were prescribed, pt advised that diarrhea and rash are possible side effects of the medications. Specific return precautions provided as well as instructions to return to the ED should sx worsen at any time. Randee Saldana MD    LABORATORY TESTS:  Recent Results (from the past 12 hour(s))   CBC WITH AUTOMATED DIFF    Collection Time: 09/03/17  9:39 PM   Result Value Ref Range    WBC 7.8 4.1 - 11.1 K/uL    RBC 3.31 (L) 4.10 - 5.70 M/uL    HGB 10.3 (L) 12.1 - 17.0 g/dL    HCT 31.0 (L) 36.6 - 50.3 %    MCV 93.7 80.0 - 99.0 FL    MCH 31.1 26.0 - 34.0 PG    MCHC 33.2 30.0 - 36.5 g/dL    RDW 17.8 (H) 11.5 - 14.5 %    PLATELET 786 306 - 497 K/uL    NEUTROPHILS 39 32 - 75 %    LYMPHOCYTES 54 (H) 12 - 49 %    MONOCYTES 6 5 - 13 %    EOSINOPHILS 1 0 - 7 %    BASOPHILS 0 0 - 1 %    ABS. NEUTROPHILS 3.1 1.8 - 8.0 K/UL    ABS. LYMPHOCYTES 4.2 (H) 0.8 - 3.5 K/UL    ABS. MONOCYTES 0.4 0.0 - 1.0 K/UL    ABS. EOSINOPHILS 0.1 0.0 - 0.4 K/UL    ABS. BASOPHILS 0.0 0.0 - 0.1 K/UL   METABOLIC PANEL, COMPREHENSIVE    Collection Time: 09/03/17  9:39 PM   Result Value Ref Range    Sodium 133 (L) 136 - 145 mmol/L    Potassium 3.9 3.5 - 5.1 mmol/L    Chloride 99 97 - 108 mmol/L    CO2 30 21 - 32 mmol/L    Anion gap 4 (L) 5 - 15 mmol/L    Glucose 76 65 - 100 mg/dL    BUN 16 6 - 20 MG/DL    Creatinine 0.96 0.70 - 1.30 MG/DL    BUN/Creatinine ratio 17 12 - 20      GFR est AA >60 >60 ml/min/1.73m2    GFR est non-AA >60 >60 ml/min/1.73m2    Calcium 8.7 8.5 - 10.1 MG/DL    Bilirubin, total 0.2 0.2 - 1.0 MG/DL    ALT (SGPT) 25 12 - 78 U/L    AST (SGOT) 21 15 - 37 U/L    Alk.  phosphatase 88 45 - 117 U/L    Protein, total 7.7 6.4 - 8.2 g/dL    Albumin 2.8 (L) 3.5 - 5.0 g/dL    Globulin 4.9 (H) 2.0 - 4.0 g/dL    A-G Ratio 0.6 (L) 1.1 - 2.2         IMAGING RESULTS:  CT NECK SOFT TISSUE W CONT   Final Result   EXAM:  CT NECK SOFT TISSUE W CONT     INDICATION:  L sided pain with h/o throat CA. Intermittent swelling and numbness  to the left side of his face x 1 week. He notes associated difficultly and pain  with swallowing.      COMPARISON: None.     CONTRAST: 70 mL of Isovue-300.     TECHNIQUE: Multislice helical CT was performed from the mid calvarium to the  aortic arch during uneventful rapid bolus intravenous contrast administration. Contiguous 2.5 mm axial images were reconstructed and lung and soft tissue  windows were generated. Coronal and sagittal reformations were generated. CT  dose reduction was achieved through use of a standardized protocol tailored for  this examination and automatic exposure control for dose modulation.      FINDINGS:     There is extensive postsurgical change in the left side of the oral pharynx with  some absent tissue of the lateral tongue and oropharyngeal soft tissues. There  is polypoid enhancing tissue in the posterior lateral oropharynx projecting to  the oral cavity measuring approximately 1.2 x 0.8 x 1.2 cm. No other clear  abnormal enhancing mass or lesion is seen.     No mass or adenopathy is identified within the neck.      The carotid and jugular vessels enhance normally with some mild atherosclerotic  plaque. .      The thyroid gland, submandibular salivary glands and parotid glands are normal.     No nasopharyngeal, pharyngeal or laryngeal mass is identified.     No abnormalities are identified in the visualized portions of the brain or  orbits.      The visualized mastoid air cells and paranasal sinuses are clear.     The visualized portions of the lung apices show centrilobular emphysematous  change but otherwise are clear.  Tracheostomy is noted.     Osseous structures show degenerative spine change with no aggressive lesion or  acute fracture.     IMPRESSION  IMPRESSION: Extensive postsurgical changes left oropharynx with enhancing 1.2  similar polypoid lesion in left posterior oral cavity may reflect inflamed  tissue but underlying neoplasm cannot be excluded. No sites suspicious for  metastatic neoplasm identified within the neck. Incidental findings as above. MEDICATIONS GIVEN:  Medications   0.9% sodium chloride infusion (0 mL/hr IntraVENous IV Completed 9/3/17 2214)   iopamidol (ISOVUE 300) 61 % contrast injection 100 mL (100 mL IntraVENous Given 9/3/17 2211)   sodium chloride (NS) flush 10 mL (10 mL IntraVENous Given 9/3/17 2211)   ketorolac (TORADOL) injection 15 mg (15 mg IntraVENous Given 9/3/17 2304)       IMPRESSION:  1. Neck pain    2. Malignant neoplasm of vocal cord (HCC)        PLAN:  1. Discharge Medication List as of 9/4/2017 12:24 AM      START taking these medications    Details   oxyCODONE (OXYIR) 5 mg capsule Take 1 Cap by mouth every four (4) hours as needed. Max Daily Amount: 30 mg., Print, Disp-5 Cap, R-0         CONTINUE these medications which have NOT CHANGED    Details   docusate (COLACE) 60 mg/15 mL syrup 12.5 mL by Per G Tube route two (2) times a day., Print, Disp-473 mL, R-0           2. Follow-up Information     Follow up With Details Comments [address] [facility] DE[facility] OF OTOLARYNGOLOGY Call today  [address]  [phone]        Return to ED if worse     Discharge Note:  12:44 AM  The pt is ready for discharge. The pt's signs, symptoms, diagnosis, and discharge instructions have been discussed and pt has conveyed their understanding. The pt is to follow up as recommended or return to ER should their symptoms worsen. Plan has been discussed and pt is in agreement. This note is prepared by Olivia Paz, acting as a Scribe for Abdoulaye Green MD.    Abdoulaye Green MD: The scribe's documentation has been prepared under my direction and personally reviewed by me in its entirety. I confirm that the notes above accurately reflects all work, treatment, procedures, and medical decision making performed by me. This note will not be viewable in [address]

## 2017-09-04 NOTE — DISCHARGE INSTRUCTIONS
Your Tracheostomy: Care Instructions  Your Care Instructions  A tracheostomy is a surgical opening through the neck into the windpipe (trachea). The opening is also called a stoma. A tracheostomy helps you breathe if you have a lung or nerve problem, an infection, or trouble handling secretions. Taking good care of a tracheostomy is very important. It can prevent infections and help keep you breathing easily. Follow-up care is a key part of your treatment and safety. Be sure to make and go to all appointments, and call your doctor if you are having problems. It's also a good idea to know your test results and keep a list of the medicines you take. How can you care for yourself at home? General tips  Your doctor or nurse will give you instructions about how to take care of your tracheostomy (trach). This will include how to suction your trach, how to clean the opening in your neck (stoma), and how to clean and replace the trach's inner tube (inner cannula). Be sure to follow all of these instructions closely. · Have your emergency supplies ready and available wherever you are. · Always wash your hands before and after caring for your tracheostomy. · Keep the air in your home moist with a room humidifier. · Eat while sitting up. If any food gets into the tube, suction it out right away. · Wear clothing that is loose around your neck, and avoid clothing with loose fibers. · In a bath or shower, avoid getting water into the tracheostomy. Cover the tube so that no water gets in but you can still breathe. You can also shower with your back to the water. · Do not swim. · Replace the tube hernandez if it gets wet or damaged. If it is not damaged, it can be washed and dried and used again. · Your tracheostomy, or \"trach\" (say \"trayk\"), has three parts: the outer cannula, the inner cannula, and the obturator. The inner cannula is the piece that you will remove and clean.   Suctioning  Always have suction supplies ready, including a fully charged suction machine. Suction the trach 3 to 4 times a day, or more if needed. For example, two of the times could be before you go to bed and when you wake up in the morning. You will need suction catheters, a suction machine, saline fluid, a small cup, and a mirror. Here are the steps to take:  1. Wash your hands with soap and water for at least 30 seconds. 2. Pour saline fluid into the cup. 3. Connect a catheter to the suction machine tubing. Dip the catheter tip into the saline fluid. Insert the wet catheter into the trach. Push the tube in gently, about 3 to 4 inches, until you feel it hit something. 4. Slowly pull the catheter out of the trach, rolling it back and forth between your fingers, with your thumb over the control valve (this turns the suction on). Do not keep the suction on for more than 10 seconds at a time. 5. Wait about 30 seconds and repeat inserting and pulling out the tube until all the mucus has been removed. Then suction the saline left in the cup.  6. Throw away the used catheter, and wash your hands again. Stoma care  The opening in your neck is called a stoma. To care for your stoma, clean and dry it 3 times a day. Do not let crust form on the skin at the stoma. You will need hydrogen peroxide, tap or sterile water, 8 or 10 cotton swabs, 2 small cups, a dry cloth, a mirror, and ointment for the skin. Follow these steps:  1. Wash your hands with soap and water for at least 30 seconds. 2. Fill one cup with half water and half hydrogen peroxide. Put 3 or 4 cotton swabs in the cup. Fill the other cup with water and put 3 or 4 swabs in that cup.  3. Clean and remove dried mucus around the stoma with the cotton swabs in the peroxide cup. Then clean off any peroxide with the swabs in the water cup.  4. Dry your skin with the cloth. Apply ointment with the remaining swabs. 5. Wash your hands again.   Cleaning the inner cannula  A cannula is the tube that fits into the stoma. Clean and replace the inner cannula 2 or 3 times each day. You will need 2 small bowls, a small brush, hydrogen peroxide, water, and a mirror. To clean the inner cannula:  1. Wash your hands with soap and water for at least 30 seconds. 2. Pour half water and half hydrogen peroxide into one bowl. Pour a small amount of water in the other bowl. 3. Unlock the inner cannula and remove it by gently pulling it out and down. Put this into the peroxide bowl to soak. Clean the inside and outside of the cannula with the brush. 4. Rinse the cannula under tap water, then let it soak in the bowl of water. Shake the cannula out and slide it gently back into the outer cannula. Turn it to lock it in place. Make sure it is locked in place and you cannot pull it out. 5. Wash your hands again. When should you call for help? Call 911 anytime you think you may need emergency care. For example, call if:  · You have severe trouble breathing, and coughing or suctioning does not help. · Your trach falls out and you cannot get it back in. Call your doctor now or seek immediate medical care if:  · You have trouble breathing after suctioning. · You have signs of infection, such as:  ¨ Increased pain, swelling, warmth, or redness in the stoma. ¨ Red streaks leading from the stoma. ¨ Pus draining from the stoma. ¨ A fever. · Your secretions change. Watch closely for changes in your health, and be sure to contact your doctor if you have any problems. Where can you learn more? Go to http://leandra-jag.info/. Enter C809 in the search box to learn more about \"Your Tracheostomy: Care Instructions. \"  Current as of: January 4, 2017  Content Version: 11.3  © 0596-5088 [employer]. Care instructions adapted under license by [employer] (which disclaims liability or warranty for this information).  If you have questions about a medical condition or this instruction, always ask your healthcare professional. [place] [address] any warranty or liability for your use of this information.

## 2017-09-04 NOTE — ED NOTES
Pt received to room from triage with steady gait. Reporting has been having throat pain and abdominal pain for the past week. Pain worsening with swallowing. Is having bowel movements regularly without any difficulty. Has had no problems with PEG tube.

## 2017-09-04 NOTE — ED NOTES
DC instructions and education provided by Dr. Beryl Jovel. Pt verbalizes understanding. Pt. Stable at DC. Ambulates with a steady gait.

## 2017-10-11 NOTE — ED TRIAGE NOTES
Patient presents to ED with c/o that feeding tube is clogged. He reports that he last used the feeding tube this morning to feed himself.

## 2017-10-12 NOTE — ED NOTES
Patient discharged by Dr. Jeannie Blum. Patient provided with discharge instructions Rx and instructions on follow up care. Patient out of ED ambulatory accompanied by friend.

## 2017-10-12 NOTE — ED NOTES
60 mL EZ-Gas placed into G-tube. Patient tolerated well. Initial syringe 30mL was difficult to push. Second syringe 30 mL flushed with more ease. Will allow to set for a few minutes and then will attempt gravity bolus.

## 2017-10-12 NOTE — ED PROVIDER NOTES
[facility] [address]  EMERGENCY DEPARTMENT HISTORY AND PHYSICAL EXAM       Date of Service: 10/11/2017   Patient Name: Merline Ingles Hence   YOB: 1945  Medical Record Number: [id number]    History of Presenting Illness     Chief Complaint   Patient presents with    Feeding Tube Problem     Ambulatory into the ED with c/o a clogged feeding tube x today.  Back Pain     Non-traumatic lower back pain. History Provided By:  patient and friend    Additional History:   Merline Ingles Hence is a 81 y.o. male with PMhx significant for HIV / throat CA who presents ambulatory to the ED with cc of a clogged feeding tube that began today. Pt complains of associated lower back pain. Per nursing, pt has stated that he was able to take breakfast this morning without issue but that the G tube has since become clogged. Pt specifically denies nausea, vomiting, diarrhea, or abdominal pain. Social Hx: - Tobacco (former), - EtOH, - Illicit Drugs    History was limited as pt is unable to speak clearly due to prior stroke with aphasia as well as the tracheotomy tube and does not know how to write. Primary Care Provider: PROVIDER UNKNOWN     Past History     Past Medical History:   Past Medical History:   Diagnosis Date    Cancer ([facility] [address]     Throat    HIV (human immunodeficiency virus infection) ([facility] [address]         Past Surgical History:   Past Surgical History:   Procedure Laterality Date    HX GI      feeding tube    HX HEENT      throat cancer removed, trach        Family History:   History reviewed. No pertinent family history. Social History:   Social History   Substance Use Topics    Smoking status: Former Smoker    Smokeless tobacco: Never Used    Alcohol use No        Allergies:   No Known Allergies      Review of Systems   Review of Systems   Constitutional: Negative for activity change, appetite change, chills, fever and unexpected weight change. HENT: Negative for congestion. Eyes: Negative for pain and visual disturbance. Respiratory: Negative for cough and shortness of breath. Cardiovascular: Negative for chest pain. Gastrointestinal: Negative for abdominal pain, diarrhea, nausea and vomiting. Genitourinary: Negative for dysuria. Positive for clogged G tube. Musculoskeletal: Positive for back pain (lower). Skin: Negative for rash. Neurological: Negative for headaches. Physical Exam  Physical Exam   Constitutional: He is oriented to person, place, and time. He appears well-developed and well-nourished. Thin male with trach tube and G tube in place. HENT:   Head: Normocephalic and atraumatic. Mouth/Throat: Oropharynx is clear and moist.   Eyes: Conjunctivae and EOM are normal. Pupils are equal, round, and reactive to light. Right eye exhibits no discharge. Left eye exhibits no discharge. Neck: Normal range of motion. Neck supple. Cardiovascular: Normal rate, regular rhythm and normal heart sounds. No murmur heard. Pulmonary/Chest: Effort normal and breath sounds normal. No respiratory distress. He has no wheezes. He has no rales. Abdominal: Soft. Bowel sounds are normal. He exhibits no distension. There is no tenderness. Musculoskeletal: Normal range of motion. He exhibits no edema. Neurological: He is alert and oriented to person, place, and time. No cranial nerve deficit. He exhibits normal muscle tone. Thick, slurred, incomprehensible speech   Skin: Skin is warm and dry. No rash noted. He is not diaphoretic. Skin surrounding G tube clear without induration or erythema. Nursing note and vitals reviewed. Medical Decision Making   I am the first provider for this patient. I reviewed the vital signs, available nursing notes, past medical history, past surgical history, family history and social history. Old Medical Records: Old medical records. Nursing notes.      Provider Notes:   G tube flushes with force but does not drain to gravity. Concern for clogging, displaced tube    ED Course:  4:45 PM  Initial assessment performed. The patients presenting problems have been discussed, and they are in agreement with the care plan formulated and outlined with them. I have encouraged them to ask questions as they arise throughout their visit. Progress Notes:  17:30  Minimal change with p[employer], will attempt tube change. 19:45 Unable to exchange tube as first tube is unable to be removed. Unable to flush, will check xray. 9:48 PM  Pt's G tube now draining with gravity. Will d/c with family. Written by Susan Diez ED Scribe, as dictated by Governor MD Josefa.    Diagnostic Study Results   Labs -   Recent Results (from the past 12 hour(s))   URINALYSIS W/ RFLX MICROSCOPIC    Collection Time: 10/11/17  7:49 PM   Result Value Ref Range    Color YELLOW/STRAW      Appearance CLEAR CLEAR      Specific gravity 1.009 1.003 - 1.030      pH (UA) 7.5 5.0 - 8.0      Protein NEGATIVE  NEG mg/dL    Glucose NEGATIVE  NEG mg/dL    Ketone NEGATIVE  NEG mg/dL    Bilirubin NEGATIVE  NEG      Blood NEGATIVE  NEG      Urobilinogen 0.2 0.2 - 1.0 EU/dL    Nitrites NEGATIVE  NEG      Leukocyte Esterase TRACE (A) NEG      WBC 0-4 0 - 4 /hpf    RBC 0-5 0 - 5 /hpf    Epithelial cells FEW FEW /lpf    Bacteria 1+ (A) NEG /hpf    Hyaline cast 0-2 0 - 5 /lpf       Radiologic Studies -  The following have been ordered and reviewed:  XR ABD (KUB)   Final Result        Study Result      Clinical indication PEG tube position     KUB obtained. Gastrografin was injected via the PEG tube. Position is  satisfactory no obstruction or extravasation. Mild fecal stasis nonspecific gas  pattern.     IMPRESSION   impression: PEG tube in place. Nonspecific gas pattern. Vital Signs-Reviewed the patient's vital signs.    Patient Vitals for the past 12 hrs:   Temp Pulse Resp BP SpO2   10/11/17 1544 98.2 °F (36.8 °C) 86 14 (!) 203/117 98 %       Medications Given in the ED:  Medications   acetaminophen (TYLENOL) tablet 1,000 mg (1,000 mg Oral Refused 10/11/17 1956)   diatrizoate meglumine-d.sodium (MD-GASTROVIEW,GASTROGRAFIN) 66-10 % contrast solution 30 mL (30 mL InterCATHeter Given by Provider 10/11/17 2048)   oxyCODONE-acetaminophen (PERCOCET) 5-325 mg per tablet 1 Tab (1 Tab Oral Given 10/11/17 2200)       Pulse Oximetry Analysis - Normal 98% on RA     Diagnosis   Clinical Impression:   1. Malfunction of gastrostomy tube [facility])         Plan:  1:   Follow-up Information     Follow up With Details Comments Contact Info    [facility] EMERGENCY DEPT  If symptoms worsen [address] [place] [address]  [phone]        2:   Current Discharge Medication List        Return to ED if Worse    Disposition Note:    DISCHARGE NOTE  10:03 PM  The patient has been re-evaluated and is ready for discharge. Reviewed available results with patient. Counseled pt on diagnosis and care plan. Pt has expressed understanding, and all questions have been answered. Pt agrees with plan and agrees to F/U as recommended, or return to the ED if their sxs worsen. Discharge instructions have been provided and explained to the pt, along with reasons to return to the ED. This note is prepared by Shahla Greco, acting as Scribe for Delano Callahan MD.    Delano Callahan MD: The scribe's documentation has been prepared under my direction and personally reviewed by me in its entirety. I confirm that the note above accurately reflects all work, treatment, procedures, and medical decision making performed by me.

## 2017-10-31 PROBLEM — C32.9 LARYNGEAL CANCER (HCC): Status: ACTIVE | Noted: 2017-01-01

## 2017-10-31 PROBLEM — G89.3 CANCER ASSOCIATED PAIN: Status: ACTIVE | Noted: 2017-01-01

## 2017-10-31 PROBLEM — Z93.1 S/P PERCUTANEOUS ENDOSCOPIC GASTROSTOMY (PEG) TUBE PLACEMENT (HCC): Status: ACTIVE | Noted: 2017-01-01

## 2017-10-31 PROBLEM — Z93.0 TRACHEOSTOMY STATUS (HCC): Status: ACTIVE | Noted: 2017-01-01

## 2017-10-31 NOTE — PROGRESS NOTES
EDMUND Machado is a 81 y.o. male who presents as a new patient. Complex medical history. Pulled the available [facility] record and reviewed it prior to the visit. He has a history of HIV and vocal cord cancer. He is followed by otolaryngology, radiation oncology and infectious disease at [facility]. It looks like he has had some problems with being lost to follow-up. 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 He has had excision of the lesion in 2013. 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 He then presented with a new oral mass and pain in September 2016 and was diagnosed with a new malignancy. This time T4 N0 M0 squamous cell carcinoma. The plan was to proceed with chemo but patient was lost to follow-up. Evidently he would have appointments made for him and multiple attempts to contact him but then he would no show. Patient claims that he never hears about these appointments. He had a hospitalization and had a tracheostomy in July 2017 due to oral hemorrhage. He then spent some time in skilled nursing facility and then went home. He gets transportation with the help of his friend. His last appointment with otolaryngology was 8/31/2017. With their help he got reestablished with infectious disease and radiation oncology. Radiation oncology completed a course of palliative radiation and ended the treatment in mid August according to their note. They also referred him to palliative care because of his unresectable status. They determined that his chance of cure at this point is low to minimal and they wanted to follow-up with him in 3 months, that would be December. He has not seen palliative care. He last saw infectious disease at [facility] 9/25/17. At that time he at home care 2-3 times per week checking his trach and PEG tube.   Drinking nutritional supplements 5-6 times a day. Weight at that appointment was 128 pounds. They feel that he has stable HIV and continue triple therapy. They also decided on PJP prophylaxis with liquid Bactrim. ________________________  He is here today because he has headache. Has been seen in the emergency room twice in recent months for this. Typically complains of left sided neck pain and left-sided headache because this is what has been involved in radiation therapy. Today he is complaining of a right sided headache which is unusual for him. Says this started bothering him about a week ago. Also says that he ran out of Percocet 1 week ago. Has never tried NSAIDs. Most of the above HPI was obtained through yes/no questions. He is very unintelligible with his tracheostomy even with the speaking valve on. The detailed history above was gleaned from the [facility] record. He cannot write and in fact when he tried to write his phone number down actually got the format backwards. He is accompanied by his friend who also has low medical literacy    PMHx:  Past Medical History:   Diagnosis Date    Cancer ([facility] [address]     Throat    HIV (human immunodeficiency virus infection) ([facility] [address]        Meds:   Current Outpatient Prescriptions   Medication Sig Dispense Refill    etravirine (INTELENCE) 200 mg tablet Take  by mouth.  raltegravir (ISENTRESS) 400 mg tablet Take  by mouth two (2) times a day.  abacavir-lamiVUDine (EPZICOM) 600-300 mg tablet Take 1 Tab by mouth daily.  oxyCODONE-acetaminophen (PERCOCET 10)  mg per tablet Take 1 Tab by mouth every four (4) hours as needed for Pain. Max Daily Amount: 6 Tabs. 30 Tab 0    oxyCODONE (OXYIR) 5 mg capsule Take 1 Cap by mouth every four (4) hours as needed. Max Daily Amount: 30 mg. 5 Cap 0    docusate (COLACE) 60 mg/15 mL syrup 12.5 mL by Per G Tube route two (2) times a day.  473 mL 0       Allergies:   No Known Allergies    Smoker:  History   Smoking Status    Former Smoker   Smokeless Tobacco    Never Used       ETOH:   History   Alcohol Use No       FH: No family history on file. ROS:   Obtained a comprehensive review of systems as best as possible. Negative except as listed in HPI. In addition:  Constitutional:   No fever, fatigue, weight loss or weight gain      Cardiac:    No chest pain      Resp:   No cough or shortness of breath      Neuro   No loss of consciousness, dizziness, seizures      Physical Exam:  Blood pressure 162/79, pulse 79, temperature 97.7 °F (36.5 °C), resp. rate 14, height 5' 11\" (1.803 m), weight 126 lb (57.2 kg), SpO2 94 %. GEN: No apparent distress. Alert. unintelligible with most questions due to a speech disorder. Cachectic  NEUROLOGIC: Strength and sensation grossly intact. Coordination and gait grossly intact. EXT: Well perfused. No edema. SKIN: No obvious rashes. Lungs clear to auscultation bilaterally. Tracheostomy appears patent  CV regular rate and rhythm  Neck, very tight scalene and sternocleidomastoid on the right, very tight superior trapezius. Palpation of the medial trapezius reproduces the right sided headache pain. Similar muscle groups are tight on the left but do not reproduce pain. Says that the left side of his face is numb   Cannot open his mouth to examine the inside. Palpation of the neck shows a distorted anatomy    Labs from [facility] reviewed. 9/26/17:  BUN 20  Creatinine 1.15 (GFR 94)  Protein 7.3  Albumin 3.4  Other labs were normal for most part       Assessment and Plan     Right sided headache, unusual for him  Appears to be tension type headache. Encouraged him to try NSAIDs for this in addition to his Percocet. He will need to take NSAIDs via PEG tube. I think 2x ibuprofen crushed would be worth a try. Also use heating pad on offending muscles    Pain, cancer related  Needs a refill on Percocet. Pulled his  which appears to be appropriate given what I expected from reading his [facility] notes.   His last refill on Percocet 10 was 9/15. Looks like he actually managed to stretch the Percocet a little bit given his history of being out for 5 days. Will refill Percocet for 1 week    HIV  Last appointment with [facility] considered him stable 9/25/17. Appears to be working with a coordinator of some kind in the infectious disease clinic    Going forward he needs palliative care vs hospice. It is not clear to me why he has not followed up with palliative care at [address] [place] [address] and in fact he does not even seem to recognize that he was referred. I asked if I could send him somewhere closer and he seemed to brighten a little bit at the suggestion so we will endeavor to make appointment with [facility]. He does not seem to understand how to make appointments for himself and I expect that if we allow him to forget he would miss the appointment. He is accompanied by Alana Gardner today. Phone [phone] (transports him to his appointments)  There is a phone number for Heidy Mane Carter [phone] (sister and emergency contact)  Phone number we have on file for the patient is wrong      ICD-10-CM ICD-9-CM    1. Laryngeal cancer ([facility] [address] C32.9 161.9 REFERRAL TO PALLIATIVE MEDICINE   2. HIV (human immunodeficiency virus infection) ([address] B20 V08 REFERRAL TO PALLIATIVE MEDICINE   3. Cancer associated pain G89.3 338.3 oxyCODONE-acetaminophen (PERCOCET 10)  mg per tablet      REFERRAL TO PALLIATIVE MEDICINE   4. Episodic tension-type headache, not intractable G44.219 339.11    5. Trapezius muscle spasm M62.838 728.85    6. Tracheostomy status ([address] Z93.0 V44.0    7. S/P percutaneous endoscopic gastrostomy (PEG) tube placement ([address] Z93.1 V44.1        AVS given.  Pt expressed understanding of instructions

## 2017-10-31 NOTE — Clinical Note
Complex patient. See note. He needs an appointment with our palliative care guys at [facility].  He does not know how to make appointments. They are asking for an afternoon appointment sometime soon. I have only given him 1 week worth of Percocet. There are several numbers in my notes to contact patient.   Rico Thakur today and I think it would be best to let him know since he is the ride but also will need to let his sister know as a backup

## 2017-10-31 NOTE — TELEPHONE ENCOUNTER
Call placed to [facility] [phone]. Spoke with Estefanía Mitchell and advised her of need for appointment in [place]. They see patients on Monday and Wednesday at that location. Usually afternoon appoitments are not available. She will discuss with physicians. Alexia Alonso contact number for [employer] She will reach out to him and see if they can make an appointment to evaluate patient next week. We discussed patient's limitations and she verbalized understanding. If she has any trouble scheduling or reaching them she will call navigator back. Will monitor patient's chart to see when scheduled and if he has attended. Once scheduled navigator will reach out to [phone] who are both on disclosure to make them aware of appointment.

## 2017-11-02 NOTE — TELEPHONE ENCOUNTER
Spoke with Renard(on disclosure) and he advised he was aware of appointment. He stated he will take patient to appointment. Tried to contact Karlene(on disclosure) but voicemailbox net yet set up.

## 2017-11-03 NOTE — TELEPHONE ENCOUNTER
Called patient to advise/confirm upcoming appt with Dr. Jocelynn Harrison on 11/06/17 phone number invalid   Also advised to please bring in patients photo ID/Drivers [employer] card and any current pain medication patient is taking to bring in the container with the medication in it.

## 2017-11-06 NOTE — ACP (ADVANCE CARE PLANNING)
Advance Medical Directive      Today, Rannie Cogan Hence, completed an Advance Medical Directiv, naming two healthcare agents:     1. Ramirez Machado, brother, [phone]  2. Pam Estevez, sister-in-law, [phone]     Mr. Machado decided in the event death is imminent and medical treatment will not help with recovery, then he does not want treatment to prolong life but wants to be made comfortable.     Mr. Machado also decided in the event he is unaware of himself, surroundings, or others and it is reasonably certain awareness will never be recovered , then he does not want treatment to prolong life but wants to be made comfortable.     Original document provided to Mr. Machado.   Copies of documents provided for healthcare agents and scanned into [employer].     Florencia Drummond, TRAMAINEW, LSW, [facility]   Social Work Supervisee for [address] [place] [address]  [phone]

## 2017-11-06 NOTE — PROGRESS NOTES
Palliative Medicine Outpatient Services  [place]: [phone] ([phone])    Patient Name: Keara Machado  YOB: 1945    Date of Current Visit: 11/06/17  Location of Current Visit:    [] [facility] Office  [] [facility] Office  [x] [address] Office  [] Home  [] Other:      Date of Initial Visit: 11/6/17   Requesting Physician: Dr. Peg Richmond  Primary Care Physician: PROVIDER UNKNOWN      SUMMARY:   Keara Machado is a 81y.o. year old with a past history of HIV on medications and follow up at [facility], vocal cord cancer s/p surgical removal, neck dissection, trach and peg placement 2 years ago at [facility] with ? Recurrence of disease but no follow up, who was referred to Palliative Medicine by Dr. Peg Richmond, his pcp for management of pain and other psychosocial issues. The patients social history includes lives alone, patient cannot read or write, sister Lester Logan lives across the street, friend Nanci Harrison drives him to appointments. PALLIATIVE DIAGNOSES:       ICD-10-CM ICD-9-CM    1. Neck pain M54.2 723.1    2. Feeding difficulties R63.3 783.3    3. Psychosocial distress Z65.8 V62.89    4. Sinusitis, unspecified chronicity, unspecified location J32.9 473.9    5. Vocal cord cancer (HCC) C32.0 161.0    6. HIV (human immunodeficiency virus infection) ([facility] B20 V08           PLAN:     Patient Instructions   Dear Keara Machado ,    It was a pleasure seeing you in the Palliative Medicine Office today. This is what we talked about:     1. Throat pain  - You are having a very hard time with this. Always in pain, head and throat hurts all the time. You have been on Oxycodone 10 mg every 6 hours that you crush and put through the feeding tube. This helped and so I am giving you a refill. 2. Cold/ sinus  - You are sick with a cold now, coughing, having shakes. - Please take Azihromycin 500 mg daily- this is an antibiotic to help with any infection you might have.   - Please take Mucinex, which will help with your cough as well two times a day.    3. Follow ups  - You have many doctor's follow ups to help care for you. - I have requested records from [facility], I will review it, talk with your doctors and make necessary follow ups for you. - I will call Maksim Couch- your friend who is in charge of bringing you to doctor's visits. This is what you have shared with us about Advance Care Planning  No flowsheet data found. The Palliative Medicine Team is here to support you and your family. We will see you again in 6 weeks    Our Nurse Navigator will check in with you by phone before that time. If there are any concerns before that time, such as medication questions, worsening symptoms or a need to see a physician for an urgent or emergent situation; please call [phone] (COPE). A physician is also on call after our normal business hours of 8am to 5pm.     In order to serve you better, please allow up to 48 hours for prescription refills to be processed. Certain medications may require more paperwork or a written prescription that you may need to  from the office. We appreciate you letting us know of any refill requests as soon as possible. We also would like you to sign up for [employer] as well. Sincerely,      Hakan Mehta MD and the Palliative Medicine Team       Counseling and Coordination:   More than 60 minutes in coordinating his care at [facility]. D/w Dr. Latanya Andrade- rad onc at [facility] who shared that patient has recurrent disease, has not completed radiation, lost to follow up  - Requesting records from [facility] for review. Will proceed with arranging necessary follow up once I review his records.        GOALS OF CARE / TREATMENT PREFERENCES:   [====Goals of Care====]  GOALS OF CARE:  Patient / health care proxy stated goals: FULL      TREATMENT PREFERENCES:   Code Status:  [x] Attempt Resuscitation       [] Do Not Attempt Resuscitation    Advance Care Planning:  Advance Care Planning 11/6/2017   Patient's Healthcare Decision Maker is: Named in scanned ACP document   Primary Decision Maker Name [address]   Primary Decision Maker Phone Number [phone]   Primary Decision Maker Relationship to Patient Sibling   Secondary Decision Maker Name [address] Phone Number [phone]   Secondary Decision Maker Relationship to Patient Other relative   Confirm Advance Directive Yes, on file       Other:  (If patient appropriate for POST, consider using PALLPOST smart phrase here)    The palliative care team has discussed with patient / health care proxy about goals of care / treatment preferences for patient.  [====Goals of Care====]         PRESCRIPTIONS GIVEN:     Medications Ordered Today   Medications    oxyCODONE IR (ROXICODONE) 10 mg tab immediate release tablet     Sig: Take 1 Tab by mouth every six (6) hours as needed. Max Daily Amount: 40 mg. Dispense:  60 Tab     Refill:  0    senna-docusate (PERICOLACE) 8.6-50 mg per tablet     Sig: Take 2 Tabs by mouth two (2) times daily as needed for Constipation. Dispense:  120 Tab     Refill:  0    azithromycin (ZITHROMAX) 500 mg tab     Sig: Take 1 Tab by mouth daily. Dispense:  7 Tab     Refill:  0    guaiFENesin ER (MUCINEX) 600 mg ER tablet     Sig: Take 1 Tab by mouth two (2) times a day. Dispense:  15 Tab     Refill:  0           FOLLOW UP:   Future Appointments  Date Time Provider Hiwot Lezama   12/18/2017 10:30 AM Van Amos MD [facility]           PHYSICIANS INVOLVED IN CARE:   Patient Care Team:  Provider Unknown as PCP - General       HISTORY:   Nursing documentation from date of visit reviewed. Reviewed patient-completed ESAS and advance care planning form. Reviewed patient record in prescription monitoring program.    CHIEF COMPLAINT: neck pain    HPI/SUBJECTIVE:    The patient is: [x] Verbal / [] Nonverbal     Patient is extremely difficult to understand.  His speech is very slurred, nods head yes to pain, unable to describe pain or provide any medical history. He nods yes to cancer but nothing more. I am not sure he is taking medications as prescribed at home. Patient seen along with MSW Andressa Lyon and his friend Concepción Chilelirina who does not know much about his medical history either. Concepción Rangel is in charge of driving patient to doctor's appointments when patient or his sister Brian Tabares ask him. Patient is in charge of keeping track of everything and he says that he does not know what is to be done. Manjula Curran knows nothing about his disease either. According to Concepción Rangel, he drives patient to appointment but patient sees doctors alone, no one has given him any information to take back to family. Malodour- patient is extremely malodorous. Likely from mouth. He reports using mouth wash, does not brush teeth. Concepción Rangel notes smell worsened over past month. Patient reports headache, neck pain and fevers as well    Peg tube- \"food keeps coming out\"- he was seen in [facility] ER to replace tube in. Food comes out after he feeds himself and so he is hungry most of the time. Neck pain- says its bad, used to take Oxycodone via tube which helped.     Clinical Pain Assessment (nonverbal scale for nonverbal patients):   [++++ Clinical Pain Assessment++++]  [++++Pain Severity++++]: Pain: 5  [++++Pain Character++++]:unknonw  [++++Pain Duration++++]:months  [++++Pain Effect++++]:functional  [++++Pain Factors++++]:none  [++++Pain Frequency++++]:constant  [++++Pain Location++++]:neck, head, face  [++++ Clinical Pain Assessment++++]       FUNCTIONAL ASSESSMENT:     Palliative Performance Scale (PPS):  PPS: 70       PSYCHOSOCIAL/SPIRITUAL SCREENING:     Any spiritual / Denominational concerns:  [] Yes /  [x] No    Caregiver Burnout:  [] Yes /  [x] No /  [] No Caregiver Present      Anticipatory grief assessment:   [x] Normal  / [] Maladaptive       ESAS Anxiety: Anxiety: 9    ESAS Depression: Depression: 10       REVIEW OF SYSTEMS:     The following systems were [] reviewed / [] unable to be reviewed  Systems: constitutional, ears/nose/mouth/throat, respiratory, gastrointestinal, genitourinary, musculoskeletal, integumentary, neurologic, psychiatric, endocrine. Positive findings noted below. Modified ESAS Completed by: provider   Fatigue: 10 Drowsiness: 4   Depression: 10 Pain: 5   Anxiety: 9 Nausea: 5   Anorexia: 7 Dyspnea: 0   Best Well-Bein Constipation: Yes   Other Problem (Comment): 0          PHYSICAL EXAM:     Wt Readings from Last 3 Encounters:   17 124 lb (56.2 kg)   10/31/17 126 lb (57.2 kg)   10/11/17 127 lb 3.3 oz (57.7 kg)     Blood pressure 138/84, pulse 94, temperature 98 °F (36.7 °C), temperature source Axillary, resp. rate 18, height 5' 11\" (1.803 m), weight 124 lb (56.2 kg), SpO2 91 %. Last bowel movement: See Nursing Note    Constitutional: patient appears very ill, cachectic, trach and peg in place, speech unintelligible  Eyes: pupils equal, anicteric  ENMT: no nasal discharge, moist mucous membranes, malodorous, pain with sinus pressure  Cardiovascular: regular rhythm, distal pulses intact  Respiratory: breathing not labored, symmetric  Gastrointestinal: soft non-tender, +bowel sounds  Musculoskeletal: no deformity, no tenderness to palpation  Skin: warm, dry  Neurologic: following commands, moving all extremities  Psychiatric: full affect, no hallucinations  Other:       HISTORY:     Past Medical History:   Diagnosis Date    Cancer ([facility]     Throat    HIV (human immunodeficiency virus infection) ([facility]       Past Surgical History:   Procedure Laterality Date    HX GI      feeding tube    HX HEENT      throat cancer removed, trach      History reviewed. No pertinent family history. History reviewed, no pertinent family history.   Social History   Substance Use Topics    Smoking status: Former Smoker    Smokeless tobacco: Never Used    Alcohol use No     No Known Allergies   Current Outpatient Prescriptions   Medication Sig    oxyCODONE IR (ROXICODONE) 10 mg tab immediate release tablet Take 1 Tab by mouth every six (6) hours as needed. Max Daily Amount: 40 mg.    senna-docusate (PERICOLACE) 8.6-50 mg per tablet Take 2 Tabs by mouth two (2) times daily as needed for Constipation.  azithromycin (ZITHROMAX) 500 mg tab Take 1 Tab by mouth daily.  guaiFENesin ER (MUCINEX) 600 mg ER tablet Take 1 Tab by mouth two (2) times a day.  etravirine (INTELENCE) 200 mg tablet Take  by mouth.  raltegravir (ISENTRESS) 400 mg tablet Take  by mouth two (2) times a day.  abacavir-lamiVUDine (EPZICOM) 600-300 mg tablet Take 1 Tab by mouth daily.  oxyCODONE (OXYIR) 5 mg capsule Take 1 Cap by mouth every four (4) hours as needed. Max Daily Amount: 30 mg.    docusate (COLACE) 60 mg/15 mL syrup 12.5 mL by Per G Tube route two (2) times a day. No current facility-administered medications for this visit. LAB DATA REVIEWED:     Lab Results   Component Value Date/Time    WBC 7.8 09/03/2017 09:39 PM    HGB 10.3 09/03/2017 09:39 PM    PLATELET 952 01/14/6787 09:39 PM     Lab Results   Component Value Date/Time    Sodium 133 09/03/2017 09:39 PM    Potassium 3.9 09/03/2017 09:39 PM    Chloride 99 09/03/2017 09:39 PM    CO2 30 09/03/2017 09:39 PM    BUN 16 09/03/2017 09:39 PM    Creatinine 0.96 09/03/2017 09:39 PM    Calcium 8.7 09/03/2017 09:39 PM      Lab Results   Component Value Date/Time    AST (SGOT) 21 09/03/2017 09:39 PM    Alk.  phosphatase 88 09/03/2017 09:39 PM    Protein, total 7.7 09/03/2017 09:39 PM    Albumin 2.8 09/03/2017 09:39 PM    Globulin 4.9 09/03/2017 09:39 PM     Lab Results   Component Value Date/Time    INR 1.0 09/28/2009 01:35 PM    Prothrombin time 9.9 09/28/2009 01:35 PM    aPTT 26.8 09/28/2009 01:35 PM      No results found for: IRON, FE, TIBC, IBCT, PSAT, FERR        CONTROLLED SUBSTANCES SAFETY ASSESSMENT (IF ON CONTROLLED SUBSTANCES):     Reviewed opioid safety handout:  [x] Yes   [] No  24 hour opioid dose >150mg morphine equivalent/day:  [] Yes   [x] No  Benzodiazepines:  [] Yes   [x] No  Sleep apnea:  [] Yes   [x] No  Urine Toxicology Testing within last 6 months:  [] Yes   [x] No  History of or new aberrant medication taking behaviors:  [] Yes   [x] No            Total time: 70m  Counseling / coordination time: 60m  > 50% counseling / coordination?: y

## 2017-11-06 NOTE — PROGRESS NOTES
Palliative Medicine Social Work     Progress Note:    Mr. Mario Machado is a 82 y/o -American male who presents at the [facility] for the first time today. His PMH includes throat CA and HIV. He has a trach and speech is nearly unintelligible. Pt's breath is malodorous. Pt lives alone in a trailer next door to his sister, Juliana Shell ([phone]), who is on his HIPAA release form for Denise Quispe. He has a friend with him today, Alyx Massey ([phone]), who was called back to pt's room to help provide information about pt's medical care. Hectorlatanya Kwong is willing to transport pt wherever he has appointments, including [place] [address], where most of pt's medical care has taken place. Pt appeared in pain, holding his neck and mouth area. Pt decided to complete an AMD today. (See ACP note). Advance Care Planning 11/6/2017   Patient's Healthcare Decision Maker is: Named in scanned ACP document   Primary Decision Maker Name [facility]   Primary Decision Maker Phone Number [phone]   Primary Decision Maker Relationship to Patient Sibling   Secondary Decision Maker Name [facility] Phone Number [phone]   Secondary Decision Maker Relationship to Patient Other relative   Confirm Advance Directive Yes, on file       Action / Assessment:  · Introduced Palliative Social Work as part of the PM supportive team by providing emotional support, resource referrals, advocacy, assistance with AMDs and counseling. · Pt in considerable pain in head and neck area, assessed by Dr. Kae Crespo. · Assisted pt in completing an AMD, contacting pt's brother, Chelo Machado, who agreed to be pt's primary Healthcare Agent. Attempted to call pt's choice for secondary Healthcare Agent, Robinson Rivero (sis-in-law, [phone]), but left a message. · Assessed support system through friend, Paramjit Kwong, who said pt lives alone but has family nearby.   · Because of problems with his feeding tube, and his pain, it does not seem pt is able to care for himself. Goals/Plans:   · Work with team and family to problem-solve a safe care plan and goals for pt. · Provide support, resource information and care for pt and family.     Donavan Hernandez, TRAMAINEW, LSW, [facility]   Social Work Supervisee for Hanny Monte [address] ACP Facilitator   Palliative Medicine I [facility]  [phone]

## 2017-11-06 NOTE — PROGRESS NOTES
Palliative Medicine Office Visit  Palliative Medicine Nurse Check In  [phone]    Patient Name: Madai Machado  YOB: 1945      Date of Office Visit: 11/6/2017     Patient states: \"  \"    From Check In Sheet (scanned in Media):  Has a medical provider talked with you about cardiopulmonary resuscitation (CPR)? [x] Yes   [] No   [] Unable to obtain    Nurse reminder to complete or update ACP FlowSheet:    Is ACP on the Problem List?    [] Yes    [x] No  IF ACP Document is ON FILE; Nurse to place ACP on Problem List     Is there an ACP Note in Chart Review/Note? [] Yes    [x] No   If NO: ALERT PROVIDER     No flowsheet data found. Is there anything that we should know about you as a person in order to provide you the best care possible? Have you been to the ER, urgent care clinic since your last visit? [] Yes   [x] No   [] Unable to obtain    Have you been hospitalized since your last visit? [] Yes   [x] No   [] Unable to obtain    Have you seen or consulted any other health care providers outside of the [address] [place] [address] since your last visit?    [] Yes   [x] No   [] Unable to obtain    Functional status (describe):       Last BM: 11/3/2017    accessed (date): 11/6/2017     Bottle review (for opioid pain medication):   Medication 1:   Date filled:   Directions:   # filled:   # left:   # pills taking per day:  Last dose taken:    Medication 2:   Date filled:   Directions:   # filled:   # left:   # pills taking per day:  Last dose taken:    Medication 3:   Date filled:   Directions:   # filled:   # left:   # pills taking per day:  Last dose taken:    Medication 4:   Date filled:   Directions:   # filled:   # left:   # pills taking per day:  Last dose taken:

## 2017-11-06 NOTE — PATIENT INSTRUCTIONS
Dear Arsalan Mtz Hence ,    It was a pleasure seeing you in the Palliative Medicine Office today. This is what we talked about:     1. Throat pain  - You are having a very hard time with this. Always in pain, head and throat hurts all the time. You have been on Oxycodone 10 mg every 6 hours that you crush and put through the feeding tube. This helped and so I am giving you a refill. 2. Cold/ sinus  - You are sick with a cold now, coughing, having shakes. - Please take Azihromycin 500 mg daily- this is an antibiotic to help with any infection you might have. - Please take Mucinex, which will help with your cough as well two times a day. 3. Follow ups  - You have many doctor's follow ups to help care for you. - I have requested records from [facility], I will review it, talk with your doctors and make necessary follow ups for you. - I will call Carli Patel- your friend who is in charge of bringing you to doctor's visits. This is what you have shared with us about Advance Care Planning  No flowsheet data found. The Palliative Medicine Team is here to support you and your family. We will see you again in 6 weeks    Our Nurse Navigator will check in with you by phone before that time. If there are any concerns before that time, such as medication questions, worsening symptoms or a need to see a physician for an urgent or emergent situation; please call [phone] (COPE). A physician is also on call after our normal business hours of 8am to 5pm.     In order to serve you better, please allow up to 48 hours for prescription refills to be processed. Certain medications may require more paperwork or a written prescription that you may need to  from the office. We appreciate you letting us know of any refill requests as soon as possible. We also would like you to sign up for [employer] as well.     Sincerely,      Db Wise MD and the Palliative Medicine Team

## 2017-11-06 NOTE — MR AVS SNAPSHOT
Visit Information Date & Time Provider Department Dept. Phone Encounter #  
 11/6/2017 10:30 AM Dirk Molina MD [facility] 13 [phone] [id number] Upcoming Health Maintenance Date Due Hepatitis C Screening 1945 DTaP/Tdap/Td series (1 - Tdap) 9/26/1968 FOBT Q 1 YEAR AGE 50-75 9/26/1997 ZOSTER VACCINE AGE 60> 7/26/2007 GLAUCOMA SCREENING Q2Y 9/26/2012 Pneumococcal 65+ High/Highest Risk (1 of 2 - PCV13) 9/26/2012 MEDICARE YEARLY EXAM 9/26/2012 INFLUENZA AGE 9 TO ADULT 8/1/2017 Allergies as of 11/6/2017  Review Complete On: 10/31/2017 By: Sebastian Corral MD  
 No Known Allergies Current Immunizations  Never Reviewed No immunizations on file. Not reviewed this visit Vitals BP Pulse Temp Resp Height(growth percentile) Weight(growth percentile) 138/84 (BP 1 Location: Right arm, BP Patient Position: Sitting) 94 98 °F (36.7 °C) (Axillary) 18 5' 11\" (1.803 m) 124 lb (56.2 kg) SpO2 BMI Smoking Status 91% 17.29 kg/m2 Former Smoker Vitals History BMI and BSA Data Body Mass Index Body Surface Area  
 17.29 kg/m 2 1.68 m 2 Your Updated Medication List  
  
   
This list is accurate as of: 11/6/17 11:37 AM.  Always use your most recent med list.  
  
  
  
  
 abacavir-lamiVUDine 600-300 mg tablet Commonly known as:  EPZICOM Take 1 Tab by mouth daily. docusate 60 mg/15 mL syrup Commonly known as:  COLACE  
12.5 mL by Per G Tube route two (2) times a day.  
  
 etravirine 200 mg tablet Commonly known as:  Shanna Everts Take  by mouth. oxyCODONE 5 mg capsule Commonly known as:  OXYIR Take 1 Cap by mouth every four (4) hours as needed. Max Daily Amount: 30 mg.  
  
 oxyCODONE-acetaminophen  mg per tablet Commonly known as:  PERCOCET 10 Take 1 Tab by mouth every four (4) hours as needed for Pain. Max Daily Amount: 6 Tabs. raltegravir 400 mg tablet Commonly known as:  ISENTRESS Take  by mouth two (2) times a day. Patient Instructions Dear Klaudia Machado , It was a pleasure seeing you in the Palliative Medicine Office today. This is what we talked about: 1. Throat pain - You are having a very hard time with this. Always in pain, head and throat hurts all the time. You have been on Oxycodone 10 mg every 6 hours that you crush and put through the feeding tube. This helped and so I am giving you a refill. 2. Cold/ sinus - You are sick with a cold now, coughing, having shakes. - Please take Azihromycin 500 mg daily- this is an antibiotic to help with any infection you might have. - Please take Mucinex, which will help with your cough as well two times a day. 3. Follow ups - You have many doctor's follow ups to help care for you. - I have [place] [facility] I will review it, talk with your doctors and make necessary follow ups for you. - I will call Morris Larson- your friend who is in charge of bringing you to doctor's visits. This is what you have shared with us about Advance Care Planning No flowsheet data found. The Palliative Medicine Team is here to support you and your family. We will see you again in 6 weeks Our Nurse Navigator will check in with you by phone before that time. If there are any concerns before that time, such as medication questions, worsening symptoms or a need to see a physician for an urgent or emergent situation; please call [phone] ([phone]). A physician is also on call after our normal business hours of 8am to 5pm.  
 
In order to serve you better, please allow up to 48 hours for prescription refills to be processed. Certain medications may require more paperwork or a written prescription that you may need to  from the office. We appreciate you letting us know of any refill requests as soon as possible. We also would like you to sign up for Sway as well. Sincerely, Morgan Denson MD and the Palliative Medicine Team 
 
 
  
Introducing [facility]! Gabbie Worrell introduces [facility] patient portal. Now you can access parts of your medical record, email your doctor's office, and request medication refills online. 1. In your internet browser, go to [email or web address]://[email or web address]. [email or web address]/[facility] 2. Click on the First Time User? Click Here link in the Sign In box. You will see the New Member Sign Up page. 3. Enter your [facility] Access Code exactly as it appears below. You will not need to use this code after youve completed the sign-up process. If you do not sign up before the expiration date, you must request a new code. · [facility] Access Code: [email or web address] Expires: 11/27/2017  5:29 PM 
 
4. Enter the last four digits of your Social Security Number (xxxx) and Date of Birth (mm/dd/yyyy) as indicated and click Submit. You will be taken to the next sign-up page. 5. Create a [facility] ID. This will be your [facility] login ID and cannot be changed, so think of one that is secure and easy to remember. 6. Create a [facility] password. You can change your password at any time. 7. Enter your Password Reset Question and Answer. This can be used at a later time if you forget your password. 8. Enter your e-mail address. You will receive e-mail notification when new information is available in [address] 9. Click Sign Up. You can now view and download portions of your medical record. 10. Click the Download Summary menu link to download a portable copy of your medical information. If you have questions, please visit the Frequently Asked Questions section of the [facility] website. Remember, [facility] is NOT to be used for urgent needs. For medical emergencies, dial 911. Now available from your iPhone and Android! Please provide this summary of care documentation to your next provider. Your primary care clinician is listed as PROVIDER UNKNOWN. If you have any questions after today's visit, please call [phone].

## 2017-11-06 NOTE — PROGRESS NOTES
Palliative Medicine  Nursing Note  [phone]  Fax [phone]        Clinic Office Visit  Patient Name: Edilma Machado  YOB: [birth year]      Advance Care Planning 11/6/2017   Patient's Healthcare Decision Maker is: Named in scanned ACP document   Primary Decision Maker Name [address]   Primary Decision Maker Phone Number [phone]   Primary Decision Maker Relationship to Patient Sibling   Secondary Decision Maker Name 25 [facility] Phone Number [phone]   Secondary Decision Maker Relationship to Patient Other relative   Confirm Advance Directive Yes, on file         Plan per       Provided New Patient Welcome Packet: Includes Opioid Safety, PM brochure and flyer, Magnet with Palliative Medicine Phone number. AVS reviewed with patient, verbalized an understanding of material discussed. Instructions given to patient to call the Palliative Medicine Office at [phone] for any questions or concerns 24 hours a day, 7 days a weeks. Follow up appointment scheduled for 6 weeks    Nurse Navigator will provide a follow up phone call in about 3 weeks. We are going to work with U to get records and information to help meet his medical needs.        Yuval Starkey, JANETTEN, RN, OCN, [facility]IA [facility]  Palliative Medicine

## 2017-11-07 NOTE — PROGRESS NOTES
Palliative Social Work Telephone Call    Note:  12:40pm "[employer] to learn if they will cover LTC with Hospice for patient. Explained pt's situation of incurable CA and family members who are unable to take care of him. Spoke with Care Manager, Brittanie Porter, who didn't know the answer but will find out and call SW back.     Enio Ramachandran, MSSW, LSW, CCM   Social Work Supervisee for [facility] 252  [phone]

## 2017-11-07 NOTE — ROUTINE PROCESS
1445- Pt in for gastrostomy tube change. Pt ambulates to angio. Consent obtained. 1505- Gtube replaced. Pt aware of care to peg. Discharged to home with transport Saeid Desir.

## 2017-11-07 NOTE — PROGRESS NOTES
Records received from [facility] and discussed with Dr. 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 This was a protracted treatment course for patient due to multiple missed appointments. Patient was later found to have recurrence of the Laryngeal ca but failed to follow up with ENT. 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 Patient underwent palliative RT again protracted due to many missed appointments but he completed therapy 8/17/17. Per Dr. Vikash Pierce, patient has uncurable 2 separate malignancies, no evidence of distant mets but he suffers from cachexia, HIV with last absolute CD4 count of 371/mm3L, Albumin 3.4 in sept 2017. Patient continues to have trouble getting to follow ups. I am not sure he ever saw an ENT specialist.  I am not sure patient is fully capable of understanding his medical condition due to poor medical literacy. I have called his brother Paloma Arambula who is the mPOA to discuss options. 1) see ENT/ rad onc, get final opioin/ confirmation that no further treatment can be done, 2) or Hospice admission for optimal pain control and comfort care at home. Patient wanted to have a protective DNR while he was in our office, I will discuss that with Paloma Arambula as well. LVM for Ramirez, will await call back.

## 2017-11-07 NOTE — PROCEDURES
PROCEDURE:G-tube replacement under flouro. INDICATION:occluded. ANESTHESIA:local.  COMPLICATION:NONE. SPECIMENS REMOVED:none. BLOOD LOSS:NONE. /ASSISTANT:HERBERT Dickerson RECOMMENDATIONS:may use. CONSENT OBTAINED:YES.  NOTES:none.

## 2017-11-08 NOTE — ACP (ADVANCE CARE PLANNING)
Advance Medical Directive     Today, Madhavi Machado, completed an Advance Medical Directiv, naming two healthcare agents:    1. Ramirez Machado, brother, [phone]  2. [address], sister-in-law, [phone]    Mr. Machado decided in the event death is imminent and medical treatment will not help with recovery, then he does not want treatment to prolong life but wants to be made comfortable. Mr. Machado also decided in the event he is unaware of himself, surroundings, or others and it is reasonably certain awareness will never be recovered , then he does not want treatment to prolong life but wants to be made comfortable. Original document provided to Mr. Machado. Copies of documents provided for healthcare agents and scanned into D1G.     Debra Vaughn, TRAMAINEW, LSW, CCM   Social Work Supervisee for Hanny Monte 1266 ACP Facilitator   Palliative Medicine I [address] [facility]  [phone]

## 2017-11-08 NOTE — PROGRESS NOTES
Palliative Social Work Telephone Call    Note:  Received a call from Diana Man of [facility]. She said if pt is admitted to a hospice at home, they can complete a 421A form to forego the UAI assessment and then pt can be admitted to a NH for LTC. If pt were admitted to a hospital and is in hospice, Aparna Santana said the 421A can be done for placement from the hospital.  SW asked if pt would need to remain in a hospital for a certain number of nights before placement, but he does not.       Plan:  Communicate the above with pt's Palliative team.    Lester Rich, MSSW, LSW, CCM   Social Work Supervisee for 130 [facility] 252 [phone]

## 2017-11-10 NOTE — PROGRESS NOTES
Palliative Social Work Telephone Call    Note:  Left a message for pt's MPOA brother, Mich Speed Hence ([phone]) to call back re: a plan of care for pt discussed in today's Palliative IDT. Plan:  When brother calls, inform him of referral to [address] [place] [address].     Beatriz Jaeger, TRAMAINEW, LSW, CCM   Social Work Supervisee for 130 [address] 252  [phone]

## 2017-11-13 NOTE — PROGRESS NOTES
Palliative Medicine  Nursing Note  [phone])  Fax [phone]        Telephone Call  Patient Name: Keara Machado  YOB: [birth year]      Advance Care Planning 11/6/2017   Patient's Healthcare Decision Maker is: Named in scanned ACP document   Primary Decision Maker Name 250 [facility]   Primary Decision Maker Phone Number [phone]   Primary Decision Maker Relationship to Patient Sibling   Secondary Decision Maker Name 25 [facility] Phone Number [phone]   Secondary Decision Maker Relationship to Patient Other relative   Confirm Advance Directive Yes, on file     Spoke with Hospice Nurse Guerline Ortega who try to make a hospice admission but was unable to complete because Mr. Machado's brother Radha Martines)  was not at the house to sign the paper work. Tyrell Arreola the PM  will reach out to [place] to help expedite the admission process for Mr. Machado.         Rashid Clemens, BSN, RN, OCN, [facility]IA [facility]  Palliative Medicine

## 2018-01-01 ENCOUNTER — HOME CARE VISIT (OUTPATIENT)
Dept: HOSPICE | Facility: HOSPICE | Age: 73
End: 2018-01-01
Payer: MEDICARE

## 2018-01-01 ENCOUNTER — HOME CARE VISIT (OUTPATIENT)
Dept: SCHEDULING | Facility: HOME HEALTH | Age: 73
End: 2018-01-01
Payer: MEDICARE

## 2018-01-01 ENCOUNTER — HOSPITAL ENCOUNTER (INPATIENT)
Age: 73
LOS: 6 days | End: 2018-01-25
Attending: INTERNAL MEDICINE | Admitting: INTERNAL MEDICINE

## 2018-01-01 VITALS
TEMPERATURE: 96.8 F | WEIGHT: 110 LBS | DIASTOLIC BLOOD PRESSURE: 62 MMHG | HEART RATE: 58 BPM | BODY MASS INDEX: 16.48 KG/M2 | SYSTOLIC BLOOD PRESSURE: 118 MMHG | RESPIRATION RATE: 16 BRPM | OXYGEN SATURATION: 97 %

## 2018-01-01 VITALS
BODY MASS INDEX: 16.78 KG/M2 | HEART RATE: 64 BPM | OXYGEN SATURATION: 99 % | WEIGHT: 112 LBS | SYSTOLIC BLOOD PRESSURE: 116 MMHG | RESPIRATION RATE: 20 BRPM | DIASTOLIC BLOOD PRESSURE: 62 MMHG | TEMPERATURE: 97.1 F

## 2018-01-01 VITALS
HEART RATE: 98 BPM | OXYGEN SATURATION: 87 % | RESPIRATION RATE: 22 BRPM | DIASTOLIC BLOOD PRESSURE: 46 MMHG | TEMPERATURE: 103.7 F | SYSTOLIC BLOOD PRESSURE: 84 MMHG

## 2018-01-01 VITALS
OXYGEN SATURATION: 95 % | HEART RATE: 94 BPM | RESPIRATION RATE: 20 BRPM | SYSTOLIC BLOOD PRESSURE: 122 MMHG | DIASTOLIC BLOOD PRESSURE: 66 MMHG

## 2018-01-01 VITALS
OXYGEN SATURATION: 99 % | SYSTOLIC BLOOD PRESSURE: 100 MMHG | RESPIRATION RATE: 20 BRPM | DIASTOLIC BLOOD PRESSURE: 60 MMHG | HEART RATE: 67 BPM

## 2018-01-01 DIAGNOSIS — C32.9 LARYNGEAL CANCER (HCC): ICD-10-CM

## 2018-01-01 DIAGNOSIS — R45.1 AGITATION: ICD-10-CM

## 2018-01-01 DIAGNOSIS — G89.3 NEOPLASM RELATED PAIN: ICD-10-CM

## 2018-01-01 DIAGNOSIS — B20 HIV (HUMAN IMMUNODEFICIENCY VIRUS INFECTION) (HCC): ICD-10-CM

## 2018-01-01 DIAGNOSIS — R53.1 WEAKNESS: ICD-10-CM

## 2018-01-01 DIAGNOSIS — R06.4 LABORED BREATHING: ICD-10-CM

## 2018-01-01 DIAGNOSIS — Z93.1 S/P PERCUTANEOUS ENDOSCOPIC GASTROSTOMY (PEG) TUBE PLACEMENT (HCC): ICD-10-CM

## 2018-01-01 DIAGNOSIS — G89.3 CANCER ASSOCIATED PAIN: ICD-10-CM

## 2018-01-01 DIAGNOSIS — K11.7 INCREASED OROPHARYNGEAL SECRETIONS: ICD-10-CM

## 2018-01-01 PROCEDURE — 74011250637 HC RX REV CODE- 250/637: Performed by: NURSE PRACTITIONER

## 2018-01-01 PROCEDURE — 0651 HSPC ROUTINE HOME CARE

## 2018-01-01 PROCEDURE — HOSPICE MEDICATION HC HH HOSPICE MEDICATION

## 2018-01-01 PROCEDURE — 3336590001 HSPC ROOM AND BOARD

## 2018-01-01 PROCEDURE — 74011250636 HC RX REV CODE- 250/636: Performed by: NURSE PRACTITIONER

## 2018-01-01 PROCEDURE — G0155 HHCP-SVS OF CSW,EA 15 MIN: HCPCS

## 2018-01-01 PROCEDURE — S9470 NUTRITIONAL COUNSELING, DIET: HCPCS

## 2018-01-01 PROCEDURE — 0656 HSPC GENERAL INPATIENT

## 2018-01-01 PROCEDURE — 74011250637 HC RX REV CODE- 250/637: Performed by: INTERNAL MEDICINE

## 2018-01-01 PROCEDURE — 99233 SBSQ HOSP IP/OBS HIGH 50: CPT | Performed by: INTERNAL MEDICINE

## 2018-01-01 PROCEDURE — A4452 WATERPROOF TAPE: HCPCS

## 2018-01-01 PROCEDURE — A7526 TRACHEOSTOMY TUBE COLLAR: HCPCS

## 2018-01-01 PROCEDURE — A6402 STERILE GAUZE <= 16 SQ IN: HCPCS

## 2018-01-01 PROCEDURE — G0299 HHS/HOSPICE OF RN EA 15 MIN: HCPCS

## 2018-01-01 PROCEDURE — G0300 HHS/HOSPICE OF LPN EA 15 MIN: HCPCS

## 2018-01-01 PROCEDURE — A6216 NON-STERILE GAUZE<=16 SQ IN: HCPCS

## 2018-01-01 RX ORDER — HYDROMORPHONE HYDROCHLORIDE 2 MG/ML
1 INJECTION, SOLUTION INTRAMUSCULAR; INTRAVENOUS; SUBCUTANEOUS
Status: DISCONTINUED | OUTPATIENT
Start: 2018-01-01 | End: 2018-01-01 | Stop reason: HOSPADM

## 2018-01-01 RX ORDER — METRONIDAZOLE 250 MG/1
500 TABLET ORAL 2 TIMES DAILY
Status: DISCONTINUED | OUTPATIENT
Start: 2018-01-01 | End: 2018-01-01 | Stop reason: HOSPADM

## 2018-01-01 RX ORDER — HYDROMORPHONE HYDROCHLORIDE 1 MG/ML
1 INJECTION, SOLUTION INTRAMUSCULAR; INTRAVENOUS; SUBCUTANEOUS
Status: DISCONTINUED | OUTPATIENT
Start: 2018-01-01 | End: 2018-01-01

## 2018-01-01 RX ORDER — GLYCOPYRROLATE 0.2 MG/ML
0.2 INJECTION INTRAMUSCULAR; INTRAVENOUS
Status: DISCONTINUED | OUTPATIENT
Start: 2018-01-01 | End: 2018-01-01 | Stop reason: HOSPADM

## 2018-01-01 RX ORDER — HYDROMORPHONE HYDROCHLORIDE 2 MG/ML
1 INJECTION, SOLUTION INTRAMUSCULAR; INTRAVENOUS; SUBCUTANEOUS
Status: DISCONTINUED | OUTPATIENT
Start: 2018-01-01 | End: 2018-01-01 | Stop reason: SDUPTHER

## 2018-01-01 RX ORDER — LORAZEPAM 2 MG/ML
0.5 CONCENTRATE ORAL
Status: DISCONTINUED | OUTPATIENT
Start: 2018-01-01 | End: 2018-01-01

## 2018-01-01 RX ORDER — METRONIDAZOLE 250 MG/1
250 TABLET ORAL 2 TIMES DAILY
Status: DISCONTINUED | OUTPATIENT
Start: 2018-01-01 | End: 2018-01-01

## 2018-01-01 RX ORDER — CHLORHEXIDINE GLUCONATE 1.2 MG/ML
15 RINSE ORAL 2 TIMES DAILY
Status: DISCONTINUED | OUTPATIENT
Start: 2018-01-01 | End: 2018-01-01

## 2018-01-01 RX ORDER — GUAIFENESIN 100 MG/5ML
600 SOLUTION ORAL
Status: DISCONTINUED | OUTPATIENT
Start: 2018-01-01 | End: 2018-01-01

## 2018-01-01 RX ORDER — MORPHINE SULFATE 20 MG/ML
20 SOLUTION ORAL
Status: DISCONTINUED | OUTPATIENT
Start: 2018-01-01 | End: 2018-01-01

## 2018-01-01 RX ORDER — LORAZEPAM 2 MG/ML
1 INJECTION INTRAMUSCULAR EVERY 4 HOURS
Status: DISCONTINUED | OUTPATIENT
Start: 2018-01-01 | End: 2018-01-01 | Stop reason: HOSPADM

## 2018-01-01 RX ORDER — FENTANYL 25 UG/1
1 PATCH TRANSDERMAL
Status: DISCONTINUED | OUTPATIENT
Start: 2018-01-01 | End: 2018-01-01 | Stop reason: HOSPADM

## 2018-01-01 RX ORDER — HALOPERIDOL 5 MG/ML
0.5 INJECTION INTRAMUSCULAR
Status: DISCONTINUED | OUTPATIENT
Start: 2018-01-01 | End: 2018-01-01 | Stop reason: HOSPADM

## 2018-01-01 RX ORDER — FLUCONAZOLE 100 MG/1
200 TABLET ORAL DAILY
Status: DISCONTINUED | OUTPATIENT
Start: 2018-01-01 | End: 2018-01-01

## 2018-01-01 RX ORDER — OXYCODONE HCL 20 MG/ML
20 CONCENTRATE, ORAL ORAL
Status: DISCONTINUED | OUTPATIENT
Start: 2018-01-01 | End: 2018-01-01

## 2018-01-01 RX ORDER — ACETAMINOPHEN 160 MG/5ML
650 SUSPENSION ORAL
Status: DISCONTINUED | OUTPATIENT
Start: 2018-01-01 | End: 2018-01-01

## 2018-01-01 RX ORDER — FACIAL-BODY WIPES
10 EACH TOPICAL
Status: DISCONTINUED | OUTPATIENT
Start: 2018-01-01 | End: 2018-01-01 | Stop reason: HOSPADM

## 2018-01-01 RX ORDER — ABACAVIR 300 MG/1
300 TABLET ORAL DAILY
Status: DISCONTINUED | OUTPATIENT
Start: 2018-01-01 | End: 2018-01-01

## 2018-01-01 RX ORDER — MORPHINE SULFATE 20 MG/ML
10 SOLUTION ORAL
Status: DISCONTINUED | OUTPATIENT
Start: 2018-01-01 | End: 2018-01-01

## 2018-01-01 RX ORDER — AMOXICILLIN 250 MG
2 CAPSULE ORAL DAILY
Status: DISCONTINUED | OUTPATIENT
Start: 2018-01-01 | End: 2018-01-01

## 2018-01-01 RX ORDER — OXYCODONE HYDROCHLORIDE 5 MG/1
20 TABLET ORAL
Status: DISCONTINUED | OUTPATIENT
Start: 2018-01-01 | End: 2018-01-01

## 2018-01-01 RX ORDER — HYOSCYAMINE SULFATE 0.12 MG/1
0.25 TABLET SUBLINGUAL EVERY 4 HOURS
Status: DISCONTINUED | OUTPATIENT
Start: 2018-01-01 | End: 2018-01-01

## 2018-01-01 RX ORDER — LORAZEPAM 2 MG/ML
1 INJECTION INTRAMUSCULAR
Status: DISCONTINUED | OUTPATIENT
Start: 2018-01-01 | End: 2018-01-01 | Stop reason: HOSPADM

## 2018-01-01 RX ORDER — HALOPERIDOL 2 MG/ML
0.5 SOLUTION ORAL
Status: DISCONTINUED | OUTPATIENT
Start: 2018-01-01 | End: 2018-01-01

## 2018-01-01 RX ORDER — AMOXICILLIN 250 MG
2 CAPSULE ORAL 2 TIMES DAILY
Status: DISCONTINUED | OUTPATIENT
Start: 2018-01-01 | End: 2018-01-01

## 2018-01-01 RX ORDER — AMOXICILLIN 250 MG
2 CAPSULE ORAL
Status: DISCONTINUED | OUTPATIENT
Start: 2018-01-01 | End: 2018-01-01

## 2018-01-01 RX ORDER — HYDROMORPHONE HYDROCHLORIDE 1 MG/ML
1 INJECTION, SOLUTION INTRAMUSCULAR; INTRAVENOUS; SUBCUTANEOUS EVERY 4 HOURS
Status: DISCONTINUED | OUTPATIENT
Start: 2018-01-01 | End: 2018-01-01

## 2018-01-01 RX ORDER — SCOLOPAMINE TRANSDERMAL SYSTEM 1 MG/1
1 PATCH, EXTENDED RELEASE TRANSDERMAL
Status: DISCONTINUED | OUTPATIENT
Start: 2018-01-01 | End: 2018-01-01 | Stop reason: HOSPADM

## 2018-01-01 RX ORDER — HYOSCYAMINE SULFATE 0.12 MG/1
0.25 TABLET SUBLINGUAL
Status: DISCONTINUED | OUTPATIENT
Start: 2018-01-01 | End: 2018-01-01

## 2018-01-01 RX ORDER — SULFAMETHOXAZOLE AND TRIMETHOPRIM 200; 40 MG/5ML; MG/5ML
4 SUSPENSION ORAL DAILY
Status: DISCONTINUED | OUTPATIENT
Start: 2018-01-01 | End: 2018-01-01

## 2018-01-01 RX ORDER — ACETAMINOPHEN 650 MG/1
650 SUPPOSITORY RECTAL
Status: DISCONTINUED | OUTPATIENT
Start: 2018-01-01 | End: 2018-01-01 | Stop reason: HOSPADM

## 2018-01-01 RX ORDER — LORAZEPAM 2 MG/ML
1 CONCENTRATE ORAL EVERY 4 HOURS
Status: DISCONTINUED | OUTPATIENT
Start: 2018-01-01 | End: 2018-01-01

## 2018-01-01 RX ORDER — LORAZEPAM 2 MG/ML
0.5 INJECTION INTRAMUSCULAR
Status: DISCONTINUED | OUTPATIENT
Start: 2018-01-01 | End: 2018-01-01

## 2018-01-01 RX ADMIN — OXYCODONE HYDROCHLORIDE 20 MG: 100 SOLUTION ORAL at 08:20

## 2018-01-01 RX ADMIN — HYDROMORPHONE HYDROCHLORIDE 1 MG: 2 INJECTION INTRAMUSCULAR; INTRAVENOUS; SUBCUTANEOUS at 03:49

## 2018-01-01 RX ADMIN — MORPHINE SULFATE 10 MG: 20 SOLUTION ORAL at 14:30

## 2018-01-01 RX ADMIN — LORAZEPAM 1 MG: 2 INJECTION INTRAMUSCULAR at 03:55

## 2018-01-01 RX ADMIN — HYDROMORPHONE HYDROCHLORIDE 1 MG: 2 INJECTION INTRAMUSCULAR; INTRAVENOUS; SUBCUTANEOUS at 20:43

## 2018-01-01 RX ADMIN — HYDROMORPHONE HYDROCHLORIDE 1 MG: 2 INJECTION INTRAMUSCULAR; INTRAVENOUS; SUBCUTANEOUS at 12:03

## 2018-01-01 RX ADMIN — HYDROMORPHONE HYDROCHLORIDE 1 MG: 2 INJECTION INTRAMUSCULAR; INTRAVENOUS; SUBCUTANEOUS at 18:00

## 2018-01-01 RX ADMIN — HYOSCYAMINE SULFATE 0.25 MG: 0.12 TABLET ORAL; SUBLINGUAL at 04:59

## 2018-01-01 RX ADMIN — LORAZEPAM 1 MG: 2 SOLUTION, CONCENTRATE ORAL at 04:47

## 2018-01-01 RX ADMIN — HYDROMORPHONE HYDROCHLORIDE 1 MG: 2 INJECTION INTRAMUSCULAR; INTRAVENOUS; SUBCUTANEOUS at 09:00

## 2018-01-01 RX ADMIN — HYDROMORPHONE HYDROCHLORIDE 1 MG: 2 INJECTION INTRAMUSCULAR; INTRAVENOUS; SUBCUTANEOUS at 16:01

## 2018-01-01 RX ADMIN — OXYCODONE HYDROCHLORIDE 20 MG: 100 SOLUTION ORAL at 07:24

## 2018-01-01 RX ADMIN — HYOSCYAMINE SULFATE 0.25 MG: 0.12 TABLET ORAL; SUBLINGUAL at 18:00

## 2018-01-01 RX ADMIN — LORAZEPAM 1 MG: 2 INJECTION INTRAMUSCULAR at 00:17

## 2018-01-01 RX ADMIN — OXYCODONE HYDROCHLORIDE 20 MG: 100 SOLUTION ORAL at 20:27

## 2018-01-01 RX ADMIN — LORAZEPAM 1 MG: 2 SOLUTION, CONCENTRATE ORAL at 13:47

## 2018-01-01 RX ADMIN — LORAZEPAM 1 MG: 2 INJECTION INTRAMUSCULAR at 12:11

## 2018-01-01 RX ADMIN — OXYCODONE HYDROCHLORIDE 20 MG: 100 SOLUTION ORAL at 04:47

## 2018-01-01 RX ADMIN — OXYCODONE HYDROCHLORIDE 20 MG: 100 SOLUTION ORAL at 12:16

## 2018-01-01 RX ADMIN — SULFAMETHOXAZOLE AND TRIMETHOPRIM 4 ML: 200; 40 SUSPENSION ORAL at 09:00

## 2018-01-01 RX ADMIN — LORAZEPAM 1 MG: 2 SOLUTION, CONCENTRATE ORAL at 20:31

## 2018-01-01 RX ADMIN — OXYCODONE HYDROCHLORIDE 20 MG: 5 TABLET ORAL at 19:41

## 2018-01-01 RX ADMIN — SULFAMETHOXAZOLE AND TRIMETHOPRIM 4 ML: 200; 40 SUSPENSION ORAL at 08:43

## 2018-01-01 RX ADMIN — LORAZEPAM 1 MG: 2 SOLUTION, CONCENTRATE ORAL at 08:20

## 2018-01-01 RX ADMIN — LORAZEPAM 1 MG: 2 INJECTION INTRAMUSCULAR at 16:01

## 2018-01-01 RX ADMIN — LORAZEPAM 1 MG: 2 SOLUTION, CONCENTRATE ORAL at 17:00

## 2018-01-01 RX ADMIN — ACETAMINOPHEN 650 MG: 650 SUPPOSITORY RECTAL at 04:04

## 2018-01-01 RX ADMIN — HYDROMORPHONE HYDROCHLORIDE 1 MG: 2 INJECTION INTRAMUSCULAR; INTRAVENOUS; SUBCUTANEOUS at 12:12

## 2018-01-01 RX ADMIN — DOCUSATE SODIUM AND SENNOSIDES 2 TABLET: 8.6; 5 TABLET, FILM COATED ORAL at 09:00

## 2018-01-01 RX ADMIN — CHLORHEXIDINE GLUCONATE 15 ML: 1.2 RINSE ORAL at 18:00

## 2018-01-01 RX ADMIN — OXYCODONE HYDROCHLORIDE 20 MG: 5 TABLET ORAL at 23:00

## 2018-01-01 RX ADMIN — HYDROMORPHONE HYDROCHLORIDE 1 MG: 2 INJECTION INTRAMUSCULAR; INTRAVENOUS; SUBCUTANEOUS at 11:14

## 2018-01-01 RX ADMIN — LORAZEPAM 1 MG: 2 SOLUTION, CONCENTRATE ORAL at 08:43

## 2018-01-01 RX ADMIN — OXYCODONE HYDROCHLORIDE 20 MG: 100 SOLUTION ORAL at 20:30

## 2018-01-01 RX ADMIN — OXYCODONE HYDROCHLORIDE 20 MG: 100 SOLUTION ORAL at 13:46

## 2018-01-01 RX ADMIN — LORAZEPAM 1 MG: 2 INJECTION INTRAMUSCULAR at 03:49

## 2018-01-01 RX ADMIN — OXYCODONE HYDROCHLORIDE 20 MG: 100 SOLUTION ORAL at 01:09

## 2018-01-01 RX ADMIN — LORAZEPAM 1 MG: 2 INJECTION INTRAMUSCULAR at 11:14

## 2018-01-01 RX ADMIN — LORAZEPAM 1 MG: 2 SOLUTION, CONCENTRATE ORAL at 01:09

## 2018-01-01 RX ADMIN — LORAZEPAM 1 MG: 2 SOLUTION, CONCENTRATE ORAL at 05:00

## 2018-01-01 RX ADMIN — OXYCODONE HYDROCHLORIDE 20 MG: 100 SOLUTION ORAL at 23:07

## 2018-01-01 RX ADMIN — ACETAMINOPHEN 650 MG: 650 SOLUTION ORAL at 17:58

## 2018-01-01 RX ADMIN — LORAZEPAM 1 MG: 2 SOLUTION, CONCENTRATE ORAL at 20:27

## 2018-01-01 RX ADMIN — OXYCODONE HYDROCHLORIDE 20 MG: 100 SOLUTION ORAL at 02:32

## 2018-01-01 RX ADMIN — HYDROMORPHONE HYDROCHLORIDE 1 MG: 2 INJECTION INTRAMUSCULAR; INTRAVENOUS; SUBCUTANEOUS at 00:30

## 2018-01-01 RX ADMIN — HYDROMORPHONE HYDROCHLORIDE 1 MG: 2 INJECTION INTRAMUSCULAR; INTRAVENOUS; SUBCUTANEOUS at 06:26

## 2018-01-01 RX ADMIN — HYOSCYAMINE SULFATE 0.25 MG: 0.12 TABLET ORAL; SUBLINGUAL at 13:47

## 2018-01-01 RX ADMIN — OXYCODONE HYDROCHLORIDE 20 MG: 100 SOLUTION ORAL at 17:58

## 2018-01-01 RX ADMIN — HYOSCYAMINE SULFATE 0.25 MG: 0.12 TABLET ORAL; SUBLINGUAL at 20:27

## 2018-01-01 RX ADMIN — HYDROMORPHONE HYDROCHLORIDE 1 MG: 2 INJECTION INTRAMUSCULAR; INTRAVENOUS; SUBCUTANEOUS at 09:08

## 2018-01-01 RX ADMIN — LORAZEPAM 1 MG: 2 SOLUTION, CONCENTRATE ORAL at 04:32

## 2018-01-01 RX ADMIN — LORAZEPAM 1 MG: 2 INJECTION INTRAMUSCULAR at 08:00

## 2018-01-01 RX ADMIN — OXYCODONE HYDROCHLORIDE 20 MG: 100 SOLUTION ORAL at 15:29

## 2018-01-01 RX ADMIN — LORAZEPAM 1 MG: 2 INJECTION INTRAMUSCULAR at 00:30

## 2018-01-01 RX ADMIN — HYDROMORPHONE HYDROCHLORIDE 1 MG: 2 INJECTION INTRAMUSCULAR; INTRAVENOUS; SUBCUTANEOUS at 05:59

## 2018-01-01 RX ADMIN — HYDROMORPHONE HYDROCHLORIDE 1 MG: 2 INJECTION INTRAMUSCULAR; INTRAVENOUS; SUBCUTANEOUS at 21:08

## 2018-01-01 RX ADMIN — OXYCODONE HYDROCHLORIDE 20 MG: 100 SOLUTION ORAL at 08:43

## 2018-01-01 RX ADMIN — LORAZEPAM 1 MG: 2 INJECTION INTRAMUSCULAR at 20:43

## 2018-01-01 RX ADMIN — LORAZEPAM 1 MG: 2 INJECTION INTRAMUSCULAR at 20:47

## 2018-01-01 RX ADMIN — CHLORHEXIDINE GLUCONATE 15 ML: 1.2 RINSE ORAL at 09:00

## 2018-01-01 RX ADMIN — OXYCODONE HYDROCHLORIDE 20 MG: 100 SOLUTION ORAL at 17:55

## 2018-01-01 RX ADMIN — LORAZEPAM 1 MG: 2 SOLUTION, CONCENTRATE ORAL at 01:10

## 2018-01-01 RX ADMIN — LORAZEPAM 1 MG: 2 INJECTION INTRAMUSCULAR at 16:24

## 2018-01-01 RX ADMIN — LORAZEPAM 1 MG: 2 SOLUTION, CONCENTRATE ORAL at 17:59

## 2018-01-01 RX ADMIN — METRONIDAZOLE 250 MG: 250 TABLET ORAL at 09:09

## 2018-01-01 RX ADMIN — LORAZEPAM 1 MG: 2 INJECTION INTRAMUSCULAR at 09:08

## 2018-01-01 RX ADMIN — OXYCODONE HYDROCHLORIDE 20 MG: 100 SOLUTION ORAL at 01:58

## 2018-01-01 RX ADMIN — ABACAVIR 300 MG: 300 TABLET ORAL at 08:43

## 2018-01-01 RX ADMIN — LORAZEPAM 1 MG: 2 INJECTION INTRAMUSCULAR at 12:02

## 2018-01-01 RX ADMIN — LORAZEPAM 1 MG: 2 SOLUTION, CONCENTRATE ORAL at 17:56

## 2018-01-01 RX ADMIN — HYOSCYAMINE SULFATE 0.25 MG: 0.12 TABLET ORAL; SUBLINGUAL at 01:09

## 2018-01-01 RX ADMIN — HYDROMORPHONE HYDROCHLORIDE 1 MG: 2 INJECTION INTRAMUSCULAR; INTRAVENOUS; SUBCUTANEOUS at 00:17

## 2018-01-01 RX ADMIN — OXYCODONE HYDROCHLORIDE 20 MG: 100 SOLUTION ORAL at 04:59

## 2018-01-01 RX ADMIN — LORAZEPAM 1 MG: 2 INJECTION INTRAMUSCULAR at 16:59

## 2018-01-01 RX ADMIN — LORAZEPAM 1 MG: 2 SOLUTION, CONCENTRATE ORAL at 21:40

## 2018-01-01 RX ADMIN — LORAZEPAM 1 MG: 2 INJECTION INTRAMUSCULAR at 21:07

## 2018-01-01 RX ADMIN — OXYCODONE HYDROCHLORIDE 20 MG: 100 SOLUTION ORAL at 11:11

## 2018-01-01 RX ADMIN — HYDROMORPHONE HYDROCHLORIDE 1 MG: 2 INJECTION INTRAMUSCULAR; INTRAVENOUS; SUBCUTANEOUS at 20:47

## 2018-01-01 RX ADMIN — HYDROMORPHONE HYDROCHLORIDE 1 MG: 2 INJECTION INTRAMUSCULAR; INTRAVENOUS; SUBCUTANEOUS at 16:59

## 2018-01-01 RX ADMIN — OXYCODONE HYDROCHLORIDE 20 MG: 5 TABLET ORAL at 18:14

## 2018-01-01 RX ADMIN — HYDROMORPHONE HYDROCHLORIDE 1 MG: 2 INJECTION INTRAMUSCULAR; INTRAVENOUS; SUBCUTANEOUS at 03:55

## 2018-01-01 RX ADMIN — OXYCODONE HYDROCHLORIDE 20 MG: 100 SOLUTION ORAL at 04:31

## 2018-01-01 RX ADMIN — METRONIDAZOLE 250 MG: 250 TABLET ORAL at 18:21

## 2018-01-01 RX ADMIN — OXYCODONE HYDROCHLORIDE 20 MG: 100 SOLUTION ORAL at 22:30

## 2018-01-01 RX ADMIN — LORAZEPAM 1 MG: 2 SOLUTION, CONCENTRATE ORAL at 01:46

## 2018-01-01 RX ADMIN — LORAZEPAM 1 MG: 2 SOLUTION, CONCENTRATE ORAL at 12:16

## 2018-01-01 RX ADMIN — ABACAVIR 300 MG: 300 TABLET ORAL at 09:00

## 2018-01-01 RX ADMIN — HYDROMORPHONE HYDROCHLORIDE 1 MG: 2 INJECTION INTRAMUSCULAR; INTRAVENOUS; SUBCUTANEOUS at 16:27

## 2018-01-19 NOTE — H&P
[facility]  [phone]    Patient Name: Dominique Machado  YOB: 1945    Date of Provider Hospice Visit: 01/19/18    Level of Care:   [] General Inpatient (GIP)    [] Routine   [] Respite    Location of Care:  [] [facility] [] [facility] [] [address] [] [facility] [x] Javier Tejada [address] [facility]    Date of [facility] Admission: 11-15-17  Hospice Medical Director at time of admission: Dr Johnathon Inman Diagnosis: laryngeal CA  Diagnoses RELATED to the terminal prognosis: dysphagia   Other Diagnoses: HIV     HOSPICE SUMMARY      Dominique Machado is a 81y.o. year old who was admitted to [facility] at the [facility] hospice house for uncontrolled pain at home. The patient's principle diagnosis has resulted in weakness, severe cachexia, malnutrition, uncontrolled pain in the head and neck area, fatigue, debility, excessive airway secretions, foul smelling discharge from trache and gastrostomy peg tube site. Functionally, the patient's Karnofsky and/or Palliative Performance Scale has declined over a period of weeks and is estimated at 40%. The patient is dependent on the following ADLs: he is dependent for some ADLs, performs some personal care but is not able to do on his own effectively, needs assistance. Objective information that support this patients limited prognosis includes:  . MPRESSION: Extensive postsurgical changes left oropharynx with enhancing 1.2  similar polypoid lesion in left posterior oral cavity may reflect inflamed  tissue but underlying neoplasm cannot be excluded. No sites suspicious for  metastatic neoplasm identified within the neck. Incidental findings as above. 9/3/17  IMPRESSION: Extensive postsurgical changes left oropharynx with enhancing 1.2  similar polypoid lesion in left posterior oral cavity may reflect inflamed  tissue but underlying neoplasm cannot be excluded.  No sites suspicious for  metastatic neoplasm identified within the neck. Incidental findings as above.      The patient/family chose comfort measures with the support of Hospice. HOSPICE DIAGNOSES   Active Symptoms:  1. Excessive airway secretions with foul odor  2. Intractable pain in the right side the neck and frontal aspect of the head  3. Foul smelling odor perigastrostomy tube  4. Anxiety and agitation      PLAN   1. Pt admitted for GIP level of care due to overwhelming symptoms of pain and agitation uncontrolled at home and not sustainable at home  2. Scheduled lorazepam 1mg via peg every 4 hours  3. Scheduled Roxicodone 20mg every 3 hours  4. Scopolamine  patch  5. Robitussin 600 mg via peg every 4 hours prn  6. Roxanol 20 mg via peg every 1 hour as needed for pain  7. Haldol 0.5mg every hours as needed for agitation  8. Lorazepam 0.5mg as needed every 1 hour  9. Comfort measures  10. Discharge home after symptoms are managed   11.  and  to assist  12. HIV medications from home via PEG tube        Prognosis estimated based on 01/19/18 clinical assessment is:   [] Hours to Days    [x] Days to Weeks    [] Other:    Communicated plan of care with: Hospice Case Manager;  Hospice IDT; Care Team     GOALS OF CARE     Resuscitation Status: DNR  Durable DNR: [x] Yes [] No    Advance Care Planning 11/6/2017   Patient's Healthcare Decision Maker is: Named in scanned ACP document   Primary Decision Maker Name 250 [facility]   Primary Decision Maker Phone Number [phone]   Primary Decision Maker Relationship to Patient Sibling   Secondary Decision Maker Name 25 [facility] Phone Number [phone]   Secondary Decision Maker Relationship to Patient Other relative   Confirm Advance Directive Yes, on file        HISTORY     History obtained from: chart, CM, SN    CHIEF COMPLAINT:    The patient is:   [] Verbal  [x] Nonverbal  [] Unresponsive    HPI/SUBJECTIVE:  81year old male with hx of laryngeal CA, HIV, dysphagia       REVIEW OF SYSTEMS     The following systems were: [] reviewed  [] unable to be reviewed    Positive ROS include:  Constitutional: fatigue, weakness, in pain, short of breath  Ears/nose/mouth/throat: increased airway secretions  Respiratory:shortness of breath, wheezing  Gastrointestinal:poor appetite, nausea, vomiting, abdominal pain, constipation, diarrhea  Musculoskeletal:pain, deformities, swelling legs  Neurologic:confusion, hallucinations, weakness  Psychiatric:anxiety, feeling depressed, poor sleep  Endocrine:     Adult Non-Verbal Pain Assessment Score: 6/10    Face  [] 0   No particular expression or smile  [] 1   Occasional grimace, tearing, frowning, wrinkled forehead  [x] 2   Frequent grimace, tearing, frowning, wrinkled forehead    Activity (movement)  [] 0   Lying quietly, normal position  [] 1   Seeking attention through movement or slow, cautious movement  [x] 2   Restless, excessive activity and/or withdrawal reflexes    Guarding  [] 0   Lying quietly, no positioning of hands over areas of body  [] 1   Splinting areas of the body, tense  [x] 2   Rigid, stiff    Physiology (vital signs)  [x] 0   Stable vital signs  [] 1   Change in any of the following: SBP > 20mm Hg; HR > 20/minute  [] 2   Change in any of the following: SBP > 30mm Hg; HR > 25/minute    Respiratory  [x] 0   Baseline RR/SpO2, compliant with ventilator  [] 1   RR > 10 above baseline, or 5% drop SpO2, mild asynchrony with ventilator  [] 2   RR > 20 above baseline, or 10% drop SpO2, asynchrony with ventilator     FUNCTIONAL ASSESSMENT     Palliative Performance Scale (PPS): 40%     PSYCHOSOCIAL/SPIRITUAL ASSESSMENT     Active Problems:    * No active hospital problems.  *    Past Medical History:   Diagnosis Date    Cancer ([facility] [address]     Throat    [facility]       Past Surgical History:   Procedure Laterality Date    HX GI      feeding tube    HX HEENT      throat cancer removed, trach      Social History Substance Use Topics    Smoking status: Former Smoker    Smokeless tobacco: Never Used    Alcohol use No     No family history on file. No Known Allergies   No current facility-administered medications for this encounter. PHYSICAL EXAM     Wt Readings from Last 3 Encounters:   01/16/18 50.8 kg (112 lb)   01/09/18 49.9 kg (110 lb)   11/15/17 54.4 kg (120 lb)       There were no vitals taken for this visit.     Supplemental O2  [] Yes  [] NO  Last bowel movement:     Currently this patient has:  [] Peripheral IV [] PICC  [] PORT [] ICD    [] Serrano Catheter [] NG Tube   [x] PEG Tube    [] Rectal Tube [] Drain  [] Other:     Constitutional: awake and alert, mildly agitated, complains of pain  Eyes: pupils equal, anicteric  ENMT: no nasal discharge, moist mucous membranes, airway secretions  Cardiovascular: regular rhythm, distal pulses intact  Respiratory: breathing not labored, symmetric  Gastrointestinal: soft non-tender, +bowel sounds  Musculoskeletal: no deformity, no tenderness to palpation  Skin: warm, dry  Neurologic:pt is able to follow commands, pt is moving all extremities  Psychiatric: full affect     Pertinent Lab and or Imaging Tests:  Lab Results   Component Value Date/Time    Sodium 133 09/03/2017 09:39 PM    Potassium 3.9 09/03/2017 09:39 PM    Chloride 99 09/03/2017 09:39 PM    CO2 30 09/03/2017 09:39 PM    Anion gap 4 09/03/2017 09:39 PM    Glucose 76 09/03/2017 09:39 PM    BUN 16 09/03/2017 09:39 PM    Creatinine 0.96 09/03/2017 09:39 PM    BUN/Creatinine ratio 17 09/03/2017 09:39 PM    GFR est AA >60 09/03/2017 09:39 PM    GFR est non-AA >60 09/03/2017 09:39 PM    Calcium 8.7 09/03/2017 09:39 PM     Lab Results   Component Value Date/Time    Protein, total 7.7 09/03/2017 09:39 PM    Albumin 2.8 09/03/2017 09:39 PM           Total time: 60  Counseling / coordination time: [place], NP

## 2018-01-19 NOTE — PROGRESS NOTES
26  Mr Machado arrived at the [facility] at 1635. Pt is alert and oriented. Pt reports pain is 9/10. Pain is located in his throat. Pt is able to get some words out. Speech is difficult to understand. Lungs diminished.  + bowel sounds. Last reported BM was this am.  Pt is able to get up without assist to the bathroom. Urinal provided. No edema noted. 1800  Rn crushed pt's medications. Pt administered his medications in his peg. Pt continues to be self care. Rn placed a Fent patch on pt's right upper chest and a Scope patch behind his right ear. Pt refused refused to get a bath. Pt refused to allow Rn to change the dressing around his trach and his PEG. Rn offered a dry erase board for easier communication. Pt also refused to use this. 1830  Pt resting comfortably. 1900  Report given. NAME OF PATIENT:  Whitney Cook Hence    LEVEL OF CARE:  GIP    REASON FOR GIP:   Pain, despite numerous changes in medications, Medication adjustment that must be monitored 24/7 and Stabilizing treatment that cannot take place at home    *PATIENT REMAINS ELIGIBLE FOR [facility] LEVEL OF CARE AS EVIDENCED BY: (MUST BE ADDRESSED OF PATIENT GIP)  Pt continues to receive Oxycodone IR 20 (4 tabs). And has on a Fentanyl patch 25 mcg. O2 SAFETY:  RA    FALL INTERVENTIONS PROVIDED:   Implemented/recommended use of non-skid footwear, Implemented/recommended use of fall risk identification flag to all team members, Implemented/recommended assistive devices and encouraged their use and Implemented/recommended environmental changes (remove hazards, lower bed, improve lighting, etc.)    INTERDISPLINARY COMMUNICATION/COLLABORATION:  Physician, MSW, Che and RN, CNA    NEW MEDICATION INITIATION DOCUMENTATION: No new medications initiated. COMFORTABLE DYING MEASURE: No  Is Patient/family satisfied with symptom level? DISCHARGE PLAN:  Pt will remain at the [facility] until his symptoms are managed. He will then return home.

## 2018-01-20 NOTE — PROGRESS NOTES
[facility] Worker Note:    LCSW visited pt to assess needs and provide support. Pt was sleeping comfortably with no reports of pain. Pt appeared comfortable. LCSW provided therapeutic and talk and touch. No family was present at bedside and staff reported no family had been into visit. Staff reported pt will probably be changed to routine level of care tomorrow. LCSW will continue to monitor and assess needs.     Felicia Baptiste18   [phone]

## 2018-01-20 NOTE — PROGRESS NOTES
[facility]  [phone]    Patient Name: Yary Machado  YOB: 1945    Date of Provider Hospice Visit: 01/20/18    Level of Care:   [x] General Inpatient (GIP)    [] Routine   [] Respite    Location of Care:  [] [facility] [place] [] [facility] [] [facility] [] [facility] [x] Hospice House THE [facility]      Principle Hospice Diagnosis: Laryngeal cancer  Other Diagnoses: HIV     HOSPICE SUMMARY   Do not cut and paste chart information other than imaging findings    Yary Machado is a 81y.o. year old who was admitted to [facility].      The patient's principle diagnosis of Laryngeal cancer has resulted in further local progression of disease and causing severe dysphagia. 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 Pt has undergone surgery for vocal cord lesion and has had radiation treatment. Pt has tracheostomy and PEG tube. He has had significant weight loss of 20lbs in past year. Pt also has worsening pain in oral cavity and generalized.  Reports numbness of left side of face including jaw and chin. Speech is also affected and difficult to understand at times.          Functionally, the patient's Karnofsky and/or Palliative Performance Scale has declined over a period of months and is estimated at 40%. The patient is dependent on the following ADLs:Ambulates slowly and independently with standby assist at times     Objective information that support this patients limited prognosis includes: CT Neck  Extensive postsurgical changes left oropharynx with enhancing 1.2  similar polypoid lesion in left posterior oral cavity may reflect inflamed  tissue but underlying neoplasm cannot be excluded. No sites suspicious for  metastatic neoplasm identified within the neck     The patient/family chose comfort measures with the support of Hospice. HOSPICE DIAGNOSES   Active Symptoms:  1. Diffuse uncontrolled pain  2. Fatigue/weakness  3.   Excessive upper airway secretions with foul odor  4. Foul-smelling odor from aiyana-gastrostomy tube  5. Restlessness/agitation  6. Declining function     PLAN   1. Continue [facility] level care due to symptoms of uncontrolled pain, restlessness and agitation requiring close monitoring and frequent medications administration and adjustments. 2. Assess residuals from the PEG tube and hold feedings if residual more than 100  3. DC antiretrovirals. 4. Continue current regimen of medications and care plan that includes schedule lorazepam and oxycodone. Patient has not required any as needed morphine or Lorazepam.  5. Add Levsin as needed to help with secretions. Continue scopolamine patch. 6.  and SW to support family needs  7. Disposition: Once patient's symptoms are stabilized may need to switch back to routine care or discharge home to continue hospice care. Prognosis estimated based on 01/20/18 clinical assessment is:   [] Hours to Days    [x] Days to Weeks    [] Other:    Communicated plan of care with: Hospice Case Manager; Hospice IDT; Care Team     GOALS OF CARE     Resuscitation Status: DNR  Durable DNR: [x] Yes [] No    Advance Care Planning 11/6/2017   Patient's Healthcare Decision Maker is: Named in scanned ACP document   Primary Decision Maker Name [address] [facility]   Primary Decision Maker Phone Number [phone]   Primary Decision Maker Relationship to Patient Sibling   Secondary Decision Maker Name 25 [facility] Phone Number [phone]   Secondary Decision Maker Relationship to Patient Other relative   Confirm Advance Directive Yes, on file        HISTORY     History obtained from: chart, hospice staff    CHIEF COMPLAINT: N/a  The patient is:   [] Verbal  [] Nonverbal  [x] Unresponsive    HPI/SUBJECTIVE: Patient is lethargic at this time and unable to open eyes or respond but appears comfortable.   Patient is breathing heavily with loud snore and has a foul odor along with increased airway secretions. REVIEW OF SYSTEMS     The following systems were: [] reviewed  [x] unable to be reviewed      Adult Non-Verbal Pain Assessment Score: 2    Face  [] 0   No particular expression or smile  [x] 1   Occasional grimace, tearing, frowning, wrinkled forehead  [] 2   Frequent grimace, tearing, frowning, wrinkled forehead    Activity (movement)  [] 0   Lying quietly, normal position  [x] 1   Seeking attention through movement or slow, cautious movement  [] 2   Restless, excessive activity and/or withdrawal reflexes    Guarding  [x] 0   Lying quietly, no positioning of hands over areas of body  [] 1   Splinting areas of the body, tense  [] 2   Rigid, stiff    Physiology (vital signs)  [x] 0   Stable vital signs  [] 1   Change in any of the following: SBP > 20mm Hg; HR > 20/minute  [] 2   Change in any of the following: SBP > 30mm Hg; HR > 25/minute    Respiratory  [x] 0   Baseline RR/SpO2, compliant with ventilator  [] 1   RR > 10 above baseline, or 5% drop SpO2, mild asynchrony with ventilator  [] 2   RR > 20 above baseline, or 10% drop SpO2, asynchrony with ventilator     FUNCTIONAL ASSESSMENT     Palliative Performance Scale (PPS): 20-30%     PSYCHOSOCIAL/SPIRITUAL ASSESSMENT     Active Problems:    * No active hospital problems. *    Past Medical History:   Diagnosis Date    Cancer ([facility]     Throat    [facility]       Past Surgical History:   Procedure Laterality Date    HX GI      feeding tube    HX HEENT      throat cancer removed, trach      Social History   Substance Use Topics    Smoking status: Former Smoker    Smokeless tobacco: Never Used    Alcohol use No     No family history on file.    No Known Allergies   Current Facility-Administered Medications   Medication Dose Route Frequency    senna-docusate (PERICOLACE) 8.6-50 mg per tablet 2 Tab  2 Tab Per G Tube DAILY    acetaminophen (TYLENOL) suppository 650 mg  650 mg Rectal Q4H PRN    bisacodyl (DULCOLAX) suppository 10 mg  10 mg Rectal DAILY PRN    haloperidol (HALDOL) 2 mg/mL oral solution 0.5 mg  0.5 mg Oral Q4H PRN    LORazepam (INTENSOL) 2 mg/mL oral concentrate 0.5 mg  0.5 mg Oral Q1H PRN    scopolamine (TRANSDERM-SCOP) 1 mg 1 Patch  1 Patch TransDERmal Q72H    guaiFENesin (ROBITUSSIN) 100 mg/5 mL oral liquid 600 mg  600 mg Oral Q4H PRN    senna-docusate (PERICOLACE) 8.6-50 mg per tablet 2 Tab  2 Tab Oral BID PRN    morphine (ROXANOL) 100 mg/5 mL (20 mg/mL) concentrated solution 20 mg  20 mg Per G Tube Q1H PRN    LORazepam (INTENSOL) 2 mg/mL oral concentrate 1 mg  1 mg Per G Tube Q4H    fluconazole (DIFLUCAN) tablet 200 mg  200 mg Oral DAILY    abacavir (ZIAGEN) tablet 300 mg (Patient Supplied)  300 mg Oral DAILY    OTHER(NON-FORMULARY) 200 mg  200 mg Oral BIDPC    fentaNYL (DURAGESIC) 25 mcg/hr patch 1 Patch  1 Patch TransDERmal Q72H    sulfamethoxazole-trimethoprim (BACTRIM;SEPTRA) 200-40 mg/5 mL oral suspension 4 mL (Patient Supplied)  4 mL Oral DAILY    chlorhexidine (PERIDEX) 0.12 % mouthwash 15 mL  15 mL Oral BID    oxyCODONE (ROXICODONE INTENSOL) 20 mg/mL concentrated solution 20 mg  20 mg Per G Tube Q3H        PHYSICAL EXAM     Wt Readings from Last 3 Encounters:   01/16/18 50.8 kg (112 lb)   01/09/18 49.9 kg (110 lb)   11/15/17 54.4 kg (120 lb)       Visit Vitals    /50    Pulse 84    Temp 97.6 °F (36.4 °C)    Resp 18       Supplemental O2  [x] Yes  [] NO  Last bowel movement:     Currently this patient has:  [x] Peripheral IV [] PICC  [] PORT [] ICD    [] Serrano Catheter [] NG Tube   [x] PEG Tube    [] Rectal Tube [] Drain  [] Other:     Constitutional: Lethargic, frail, labored breathing with increased airway secretions  Eyes: Closed  ENMT: Increased airway secretions  Cardiovascular: Distant heart sounds  Respiratory: Slightly labored breathing with chest secretions  Gastrointestinal: PEG tube present, flat abdomen, generalized tenderness, no rebound, bowel sounds present  Musculoskeletal: Diffuse tenderness to palpation, no contractures or deformities  Skin: No rashes, warm and dry  Neurologic: Lethargic  Psychiatric: Flat affect  Other:       Pertinent Lab and or Imaging Tests:  Lab Results   Component Value Date/Time    Sodium 133 09/03/2017 09:39 PM    Potassium 3.9 09/03/2017 09:39 PM    Chloride 99 09/03/2017 09:39 PM    CO2 30 09/03/2017 09:39 PM    Anion gap 4 09/03/2017 09:39 PM    Glucose 76 09/03/2017 09:39 PM    BUN 16 09/03/2017 09:39 PM    Creatinine 0.96 09/03/2017 09:39 PM    BUN/Creatinine ratio 17 09/03/2017 09:39 PM    GFR est AA >60 09/03/2017 09:39 PM    GFR est non-AA >60 09/03/2017 09:39 PM    Calcium 8.7 09/03/2017 09:39 PM     Lab Results   Component Value Date/Time    Protein, total 7.7 09/03/2017 09:39 PM    Albumin 2.8 09/03/2017 09:39 PM           Total time: 35 minutes  Counseling / coordination time: 15 minutes spent in discussing case with hospice staff  > 50% counseling / coordination?:  No

## 2018-01-20 NOTE — PROGRESS NOTES
0700 Received report from Clarice Lopez, I/O and Kardex  Patient was admitted to the [facility] Level of care   Hospice Diagnosis- Laryngeal cancer  Other History includes- HIV positive,  0830 Assessment completed   Neuro- Lethargic   Muscular-moves all extremities   Resp-lungs sounds diminished in bases resp  Oxygen - room air  Cardio-Heart regular, pulses palpable, skin warm and dry, cap refill less than 3 sec, distal extremities warm,   GI- Abd soft, nondistended, nontender, bowel sounds hypoactive, incontinent of bowels,   Diet - Peptimenum 1 1/2 cans per day  Last BM - 1/19  Appetite - tube feeds  -continent of urine, voided x 1  Skin/Wounds - intact  Edema- none  Access sites- none  Family- Sister and brother    0830 PEG checked 100 cc residual, tube feed held at this time, scheduled meds given via PEG,  Ziagen crushed and given via PEG, Held Intelence as cannot be crushed. Prior to arrival to room pt got up with CNA assistamce and voided, was very unsteady on feet, urinal provided and bed alarm set. Mouth care done at this time. Foul odor appears to be coming from trach   1200 Scheduled meds via PEG, residual 20 cc tube feeding given 200 cc with 30 cc water flush  Pt appears comfortable with slight grimacing  1500 Scheduled meds at this time via PEG, noted pt is coughing possible secretions behind passe muer, passe amena removed, trach care done, inner cannula removed and cleaned, replaced,  pt expelled large amount of grey/purulent secretions, trach collar placed on patient, 1l  Oxygen started just to give pt the humidity and to control coughed up secretions  1800 Low residual, Peptamen one bottle given,  Bladder distended 16 Fr carias inserted, scheduled meds given via PEG, pt is restless at this time  1900 Report to oncoming shift.       NAME OF PATIENT:  Rommel Pry Hence     LEVEL OF CARE:  [facility]     REASON FOR GIP:   Pain, despite numerous changes in medications, Medication adjustment that must be monitored 24/7 and Stabilizing treatment that cannot take place at home     *PATIENT REMAINS ELIGIBLE FOR GIP LEVEL OF CARE AS EVIDENCED BY: (MUST BE ADDRESSED OF PATIENT GIP)  Pt is receiving scheduled Oxycodone and Ativan for pain that was not well managed @ home. Increased secretions today passe muer removed suctioned numerous times. Pt requires frequent monitoring     REASON FOR RESPITE:        O2 SAFETY: Keep concentrator 6 inches from wall  Post sign on door  Educate family     FALL INTERVENTIONS PROVIDED:   Implemented/recommended use of non-skid footwear, Implemented/recommended use of fall risk identification flag to all team members, Implemented/recommended assistive devices and encouraged their use, Implemented/recommended resources for alarm system (personal alarm, bed alarm, call bell, etc.)  and Implemented/recommended environmental changes (remove hazards, lower bed, improve lighting, etc.)     INTERDISPLINARY COMMUNICATION/COLLABORATION:  Physician, MSW, [place] and RN, CNA     NEW MEDICATION INITIATION DOCUMENTATION:        Reason medication is being initiated:       MD / Provider name consulted re: change in status / initiation of new medication:       New Symptom(s):       New Order(s):       Name of the person notified of the changes:       Name of person being taught:       Instructions given:       Side Effects taught:       Response to teaching:          COMFORTABLE DYING [address] free  Is Patient/family satisfied with symptom level?  yes     DISCHARGE PLAN:  Home with family and be followed by home hospice.

## 2018-01-20 NOTE — HOSPICE
NAME OF PATIENT:  Kavya Mckinney Hence    LEVEL OF CARE:  GIP    REASON FOR GIP:   Pain, despite numerous changes in medications, Medication adjustment that must be monitored 24/7 and Stabilizing treatment that cannot take place at home    *PATIENT REMAINS ELIGIBLE FOR GIP LEVEL OF CARE AS EVIDENCED BY: (MUST BE ADDRESSED OF PATIENT GIP)  Pt is receiving scheduled Oxycodone and Ativan for pain that was not well managed @ home. REASON FOR RESPITE:      O2 SAFETY:    FALL INTERVENTIONS PROVIDED:   Implemented/recommended use of non-skid footwear, Implemented/recommended use of fall risk identification flag to all team members, Implemented/recommended assistive devices and encouraged their use, Implemented/recommended resources for alarm system (personal alarm, bed alarm, call bell, etc.)  and Implemented/recommended environmental changes (remove hazards, lower bed, improve lighting, etc.)    INTERDISPLINARY COMMUNICATION/COLLABORATION:  Physician, MSW, [place] and RN, CNA    NEW MEDICATION INITIATION DOCUMENTATION:      Reason medication is being initiated:      MD / Provider name consulted re: change in status / initiation of new medication:      New Symptom(s):      New Order(s):      Name of the person notified of the changes:      Name of person being taught:      Instructions given:      Side Effects taught:      Response to teachin [address] free  Is Patient/family satisfied with symptom level?  yes    DISCHARGE PLAN:  Home with family and be followed by home hospice. 19:20,report received,assumed care of pt who is GIP for uncontrolled pain,hospice dx is Laryngeal cancer. Pt was admitted today from home. He is in bed with eyes closed. He did not open his eyes to my verbal stimulation,after touching his arm and speaking loudly,he did open his eyes. He was difficult to engage and assess. He did not appear to be in pain. Obadiwilfredo Ledesma is plugged and  intact,chest with coarse breath sounds. PEG tube is intact. Pt has an unpleasant odor,foul oral smell. No secretions are noted. Home hospice RN brought some TF here for him. Peg w/o residual,1 can given,pt stopped me after infusing it,saying \"no more\". He is difficult to communicate with,he is used to being independent. He refuses to take his jeans off. Urinal is within reach,but no urine voided since I have been here. 21:00,scheduled Ativan given via PEG,eyes remain closed. 23:00,scheduled Oxycodone 20mg via PEG given,eyes remain closed. 01:15,scheduled Ativan and Oxycodone given,pt asleep.  03:00,asleep.  05:00,scheduled Oxycodone and Ativan given for comfort. Pt did eventually arouse after being washed with warm bath water. PEG site was cleaned,new drsg applied. Pt did not communicate with me,but he appears comfortable,repositioned on left side. Pt is refusing mouth care,which has a foul odor.

## 2018-01-21 NOTE — PROGRESS NOTES
1900: Shift report received from Nina Paige, [address]: Pt in bed, unresponsive, no eye opening, no other response to voice. Pt moving extremities, non purposeful movement. Skin warm and dry, but intact. Lungs diminished and no bowel sounds audible. Radial pulse palpable and regular. Breathing regular and shallow. Medicated with scheduled meds via PEG tube. 2300: medicated with scheduled meds. 0030: Pt bathed. Gets a little agitated, but otherwise tolerates this well. Suctioned via trach and with sponge. Pt coughs and tries to grab trach, does not tolerate this well. Bites on sponge. Mouth/tongue and trach has very strong odor. Mouth has a lot of plaque. Removed some, but pt does not tolerate it, closes mouth and tries to grab it. 0410: Pt coughing up secretions, suctioned, oxygen mask cleaned. Pt gets agitated and starts coughing when suctioned, moving his arms towards trach. 0717: Pt turned and suctioned. Pt has coughed up secretions, trach dressing/gauze changed.      NAME OF PATIENT:  [place] Hence    LEVEL OF CARE:  GIP    REASON FOR GIP:   Pain, despite numerous changes in medications, Unmanageable respiratory distress, Medication adjustment that must be monitored 24/7 and Stabilizing treatment that cannot take place at home    *PATIENT REMAINS ELIGIBLE FOR GIP LEVEL OF CARE AS EVIDENCED BY: (MUST BE ADDRESSED OF PATIENT GIP)      REASON FOR RESPITE:  n/a    O2 SAFETY:  Concentrator positioning (6\" from furniture/drapes), No petroleum based products on face while oxygen in use and Oxygen sign on the door    FALL INTERVENTIONS PROVIDED:   Implemented/recommended use of fall risk identification flag to all team members, Implemented/recommended resources for alarm system (personal alarm, bed alarm, call bell, etc.)  and Implemented/recommended environmental changes (remove hazards, lower bed, improve lighting, etc.)    INTERDISPLINARY COMMUNICATION/COLLABORATION:  Physician, MSW, Kelechi Elaine and RNKAMLA MEDICATION INITIATION DOCUMENTATION:  no changes made on night shift    COMFORTABLE DYING MEASURE:  Is Patient/family satisfied with symptom level?  yes    DISCHARGE PLAN: Possibly end of life at the [facility]

## 2018-01-21 NOTE — PROGRESS NOTES
Problem: Falls - Risk of  Goal: *Absence of Falls  Document Ion Fall Risk and appropriate interventions in the flowsheet. Outcome: Progressing Towards Goal  Fall Risk Interventions:  Mobility Interventions: Bed/chair exit alarm    Mentation Interventions: Bed/chair exit alarm    Medication Interventions: Bed/chair exit alarm    Elimination Interventions: Bed/chair exit alarm     Pt is lethargic very little movement fall risk has decreased             Problem: Pain  Goal: Verbalize satisfaction of level of comfort and symptom control  Outcome: Progressing Towards Goal  Pain well controlled with scheduled meds    Problem: End of Life Process  Goal: Demonstrate understanding of end of life processes  Patient/caregiver will understand end of life processes.    Outcome: Progressing Towards Goal  End of life signs and symptoms discussed with family members and is ongoing education

## 2018-01-21 NOTE — PROGRESS NOTES
0700 Received report from [address]  utilizing SBAR, I/O and Kardex  Patient was admitted to the [facility] Level of care   Hospice Diagnosis- Laryngeal cancer  Other History includes- HIV positive    0815  Assessment completed   Neuro- Lethargic   Muscular-moves all extremities   Resp-lungs sounds diminished in bases resp, trach to trach collar 1L NC for humidity, passe muer removed for copious secretions  Oxygen - one liter  Cardio-Heart regular, pulses palpable, skin warm and dry, cap refill less than 3 sec, distal extremities warm,   GI- Abd soft, nondistended, nontender, bowel sounds hypoactive, incontinent of bowels,   Diet - Peptamenum 1 1/2 cans per day  Last BM - 1/19  Appetite - tube feeds  -carias placed for bladder distention yesterday  Skin/Wounds - intact  Edema- none  Access sites- none  Family- Sister and brother updated on status    0800 PEG residual 120 tube feeds held scheduled meds given. 0930 Dr Frosty Kayser rounding reported moderate secretions from trach started suctioning yesterday, passe muer removed, increase in gastric residuals. Oxygen increased to 3l  1100 PEG residual 60 one half can Peptamen given with scheduled med. [address] care done, suctioned, mouth care x 3  1300 Gastric bile drainage leaking from site, tubing is closed, reported incident to Dr Frosty Kayser no change in orders.    1430 [facility] and her  called in updated on status.    1700 Fever medicated with tylenol susp, trach care and mouth care done. 1900 Report to oncoming shift.       NAME OF PATIENT:  Regino RUFF Carter      LEVEL OF CARE:  [facility]      REASON FOR GIP:   Pain, despite numerous changes in medications, Medication adjustment that must be monitored 24/7 and Stabilizing treatment that cannot take place at home      *PATIENT REMAINS ELIGIBLE FOR [facility] LEVEL OF CARE AS EVIDENCED BY: (MUST BE ADDRESSED OF PATIENT GIP)  Pt is receiving scheduled Oxycodone and Ativan for pain that was not well managed @ home.  Increased secretions today passe muer removed suctioned numerous times. Pt requires frequent monitoring      REASON FOR RESPITE:          O2 SAFETY: Keep concentrator 6 inches from wall  Post sign on door  Educate family      FALL INTERVENTIONS PROVIDED:   Implemented/recommended use of non-skid footwear, Implemented/recommended use of fall risk identification flag to all team members, Implemented/recommended assistive devices and encouraged their use, Implemented/recommended resources for alarm system (personal alarm, bed alarm, call bell, etc.)  and Implemented/recommended environmental changes (remove hazards, lower bed, improve lighting, etc.)      INTERDISPLINARY COMMUNICATION/COLLABORATION:  Physician, MSW, [place] and RN, CNA      NEW MEDICATION INITIATION DOCUMENTATION:          Reason medication is being initiated:        MD / Provider name consulted re: change in status / initiation of new medication:        New Symptom(s):        New Order(s):        Name of the person notified of the changes:        Name of person being taught:        Instructions given:        Side Effects taught:        Response to teaching:            COMFORTABLE DYING [address] free  Is Patient/family satisfied with symptom level?  yes      DISCHARGE PLAN:  Home with family and be followed by home hospice.

## 2018-01-21 NOTE — PROGRESS NOTES
[facility]  [phone]    Patient Name: Ta Machado  YOB: 1945    Date of Provider Hospice Visit: 01/21/18    Level of Care:   [x] General Inpatient (GIP)    [] Routine   [] Respite    Location of Care:  [] [facility] [] [facility] [] [facility] [] [facility] [x] Hospice House [facility]      Principle Hospice Diagnosis: Laryngeal cancer  Other Diagnoses: HIV     HOSPICE SUMMARY   Do not cut and paste chart information other than imaging findings    Ta Machado is a 81y.o. year old who was admitted to [address] [place] [address].      The patient's principle diagnosis of Laryngeal cancer has resulted in further local progression of disease and causing severe dysphagia. 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 Pt has undergone surgery for vocal cord lesion and has had radiation treatment. Pt has tracheostomy and PEG tube. He has had significant weight loss of 20lbs in past year. Pt also has worsening pain in oral cavity and generalized.  Reports numbness of left side of face including jaw and chin. Speech is also affected and difficult to understand at times.          Functionally, the patient's Karnofsky and/or Palliative Performance Scale has declined over a period of months and is estimated at 40%. The patient is dependent on the following ADLs:Ambulates slowly and independently with standby assist at times     Objective information that support this patients limited prognosis includes: CT Neck  Extensive postsurgical changes left oropharynx with enhancing 1.2  similar polypoid lesion in left posterior oral cavity may reflect inflamed  tissue but underlying neoplasm cannot be excluded. No sites suspicious for  metastatic neoplasm identified within the neck     The patient/family chose comfort measures with the support of Hospice. HOSPICE DIAGNOSES   Active Symptoms:  1. Diffuse uncontrolled pain  2. Fatigue/weakness  3.   Excessive upper airway secretions with foul odor  4. Foul-smelling odor from aiyana-gastrostomy tube  5. Restlessness/agitation  6. Declining function     PLAN   1. Continue [facility] level care due to symptoms of increased oral vs respiratory secretions  2. Start levsin 0.25-mg per PEG every 4 hours  3. Continue scopolamine patch Peptamine   4. Decrease tube feeds to 1 can Peptamine Complete 4 times a day  5. Continue assess residuals from the PEG tube and hold feedings if residual more than 100  6. Continue fentanyl patch, scheduled lorazepam, scheduled oxycodone  7.  and SW to support family needs  8. Disposition: Once patient's symptoms are stabilized may need to switch back to routine care or discharge home to continue hospice care. Prognosis estimated based on 01/21/18 clinical assessment is:   [] Hours to Days    [x] Days to Weeks    [] Other:    Communicated plan of care with: Hospice Case Manager; Hospice IDT; Care Team     GOALS OF CARE     Resuscitation Status: DNR  Durable DNR: [x] Yes [] No    Advance Care Planning 11/6/2017   Patient's Healthcare Decision Maker is: Named in scanned ACP document   Primary Decision Maker Name [facility]   Primary Decision Maker Phone Number [phone]   Primary Decision Maker Relationship to Patient Sibling   Secondary Decision Maker Name [facility] Phone Number [phone]   Secondary Decision Maker Relationship to Patient Other relative   Confirm Advance Directive Yes, on file        HISTORY     History obtained from: chart, hospice staff    CHIEF COMPLAINT: N/a  The patient is:   [] Verbal  [] Nonverbal  [x] Unresponsive    HPI/SUBJECTIVE: decreased agitation overnight on scheduled lorazepam and oxycodone. Required suctioning through trach several times past 24 hours for respiratory secretions. Nursing staff only able to give 3 cans tube feeding in past 24 hours due to increased residuals.     Patient required following medications past 24 hours: scheduled lorazepam, fentanyl patch, scheduled oxycodone, scopolamine patch  Required suctioning through trach several times past 24 hours. REVIEW OF SYSTEMS     The following systems were: [] reviewed  [x] unable to be reviewed      Adult Non-Verbal Pain Assessment Score: 1    Face  [] 0   No particular expression or smile  [x] 1   Occasional grimace, tearing, frowning, wrinkled forehead  [] 2   Frequent grimace, tearing, frowning, wrinkled forehead    Activity (movement)  [x] 0   Lying quietly, normal position  [] 1   Seeking attention through movement or slow, cautious movement  [] 2   Restless, excessive activity and/or withdrawal reflexes    Guarding  [x] 0   Lying quietly, no positioning of hands over areas of body  [] 1   Splinting areas of the body, tense  [] 2   Rigid, stiff    Physiology (vital signs)  [x] 0   Stable vital signs  [] 1   Change in any of the following: SBP > 20mm Hg; HR > 20/minute  [] 2   Change in any of the following: SBP > 30mm Hg; HR > 25/minute    Respiratory  [x] 0   Baseline RR/SpO2, compliant with ventilator  [] 1   RR > 10 above baseline, or 5% drop SpO2, mild asynchrony with ventilator  [] 2   RR > 20 above baseline, or 10% drop SpO2, asynchrony with ventilator     FUNCTIONAL ASSESSMENT     Palliative Performance Scale (PPS): 20-30%     PSYCHOSOCIAL/SPIRITUAL ASSESSMENT     Active Problems:    * No active hospital problems. *    Past Medical History:   Diagnosis Date    Cancer ([facility] [address]     Throat    HIV ([facility] immunodeficiency virus infection) ([facility]       Past Surgical History:   Procedure Laterality Date    HX GI      feeding tube    HX HEENT      throat cancer removed, trach      Social History   Substance Use Topics    Smoking status: Former Smoker    Smokeless tobacco: Never Used    Alcohol use No     No family history on file.    No Known Allergies   Current Facility-Administered Medications   Medication Dose Route Frequency    hyoscyamine SL (LEVSIN/SL) tablet 0.25 mg  0.25 mg Per G Tube Q4H PRN    senna-docusate (PERICOLACE) 8.6-50 mg per tablet 2 Tab  2 Tab Per G Tube DAILY    acetaminophen (TYLENOL) suppository 650 mg  650 mg Rectal Q4H PRN    bisacodyl (DULCOLAX) suppository 10 mg  10 mg Rectal DAILY PRN    haloperidol (HALDOL) 2 mg/mL oral solution 0.5 mg  0.5 mg Oral Q4H PRN    LORazepam (INTENSOL) 2 mg/mL oral concentrate 0.5 mg  0.5 mg Oral Q1H PRN    scopolamine (TRANSDERM-SCOP) 1 mg 1 Patch  1 Patch TransDERmal Q72H    guaiFENesin (ROBITUSSIN) 100 mg/5 mL oral liquid 600 mg  600 mg Oral Q4H PRN    senna-docusate (PERICOLACE) 8.6-50 mg per tablet 2 Tab  2 Tab Oral BID PRN    morphine (ROXANOL) 100 mg/5 mL (20 mg/mL) concentrated solution 20 mg  20 mg Per G Tube Q1H PRN    LORazepam (INTENSOL) 2 mg/mL oral concentrate 1 mg  1 mg Per G Tube Q4H    fluconazole (DIFLUCAN) tablet 200 mg  200 mg Oral DAILY    abacavir (ZIAGEN) tablet 300 mg (Patient Supplied)  300 mg Oral DAILY    fentaNYL (DURAGESIC) 25 mcg/hr patch 1 Patch  1 Patch TransDERmal Q72H    sulfamethoxazole-trimethoprim (BACTRIM;SEPTRA) 200-40 mg/5 mL oral suspension 4 mL (Patient Supplied)  4 mL Oral DAILY    chlorhexidine (PERIDEX) 0.12 % mouthwash 15 mL  15 mL Oral BID    oxyCODONE (ROXICODONE INTENSOL) 20 mg/mL concentrated solution 20 mg  20 mg Per G Tube Q3H        PHYSICAL EXAM     Wt Readings from Last 3 Encounters:   01/16/18 50.8 kg (112 lb)   01/09/18 49.9 kg (110 lb)   11/15/17 54.4 kg (120 lb)       Visit Vitals    /54 (BP 1 Location: Left arm, BP Patient Position: At rest)    Pulse 79    Temp 99.1 °F (37.3 °C)    Resp 18    SpO2 (!) 87%       Supplemental O2  [x] Yes  [] NO  Last bowel movement:     Currently this patient has:  [x] Peripheral IV [] PICC  [] PORT [] ICD    [] Serrano Catheter [] NG Tube   [x] PEG Tube    [] Rectal Tube [] Drain  [x] Other: trach    Constitutional: ill-appearing man, lying in bed, foul-smelling odor  Eyes: Closed  ENMT: Increased airway secretions  Cardiovascular: Distant heart sounds  Respiratory: Slightly labored breathing with chest secretions  Gastrointestinal: PEG tube present, flat abdomen, generalized tenderness, no rebound, bowel sounds present  Musculoskeletal: Diffuse tenderness to palpation, no contractures or deformities  Skin: No rashes, warm and dry; no skin or extremity mottling  Neurologic: does not respond to voice; slight grimacing during exam  Psychiatric: unable to assess due to medical condition  Other:       Pertinent Lab and or Imaging Tests:  Lab Results   Component Value Date/Time    Sodium 133 09/03/2017 09:39 PM    Potassium 3.9 09/03/2017 09:39 PM    Chloride 99 09/03/2017 09:39 PM    CO2 30 09/03/2017 09:39 PM    Anion gap 4 09/03/2017 09:39 PM    Glucose 76 09/03/2017 09:39 PM    BUN 16 09/03/2017 09:39 PM    Creatinine 0.96 09/03/2017 09:39 PM    BUN/Creatinine ratio 17 09/03/2017 09:39 PM    GFR est AA >60 09/03/2017 09:39 PM    GFR est non-AA >60 09/03/2017 09:39 PM    Calcium 8.7 09/03/2017 09:39 PM     Lab Results   Component Value Date/Time    Protein, total 7.7 09/03/2017 09:39 PM    Albumin 2.8 09/03/2017 09:39 PM           Total time: 35 minutes  Counseling / coordination time: 15 minutes spent in discussing case with hospice staff  > 50% counseling / coordination?:

## 2018-01-21 NOTE — PROGRESS NOTES
Problem: Airway Clearance - Ineffective  Goal: *Adequate oxygenation  Outcome: Progressing Towards Goal  Pt suctioned and repositioned, mouth/oral care done with suctioning. Trach cleaned.  O2 saturation at 94%

## 2018-01-22 NOTE — PROGRESS NOTES
0700 Received report from [address], I/O and Kard[employer]  Patient [facility] the [facility] Level [facility] care   Hospice Diagnosis- Laryngeal cancer  Other History includes- HIV positive     0730  Assessment completed   Neuro- Lethargic, responds to tactile stimuli  Muscular-moves upper arms slightly, non purposefully   Resp-lungs sounds diminished in bases resp, trach to trach collar 3L NC for humidity, passe muer removed for copious secretions  Oxygen - 3LNC  Cardio-Heart regular, pulses palpable, skin warm and dry, cap refill less than 3 sec, distal extremities cool,   GI- Abd soft, nondistended, nontender, bowel sounds hypoactive, incontinent of bowels,   Diet - Peptamen 1 can per day several feeds held due to high residuals  Last BM - 1/19  Appetite - tube feeds  -carias placed for bladder distention   Skin/Wounds - intact  Edema- none  Access sites- none  Family- Sister and brother updated on status     0730  Nite shift reported PEG site leaking again without pt being fed, large amount gastric secretions building up and leaking from site. 0750 22  G IV cath placed left forearm in anticipation MD will change pt meds to IV due to PEG not working. No bowel sounds ausculated  0755 Oxicodone due at this time, dose is one cc placed in cheek but did cough and dont think he will be able to tolerate any sublingual meds. 0800 PEG placed to drainage at this time awaiting MD arrival  0805 Trach care and mouth care done, suctioned thick grey purulent secretions from trach  0930 Niece visiting at this time very upset has just lost her father, pt's brother past Nov, comfort given.   1000 NP Ozzie notified of problems with PEG medications changed to intravenous and feedings stopped, PEG to drainage  1200 Pt recieves first dose of dlaudid and ativan via left forearm IV  1210 Trach suctioned moderate amt secretions  1215 Family have arrived updated on status they partially understand, when I explained that the PEG is not working and there are no bowel sounds, brother wanted to know if we could feed pt in the vein. EOL and quality of life discussed. Family also had question and answer time with the MD Mcdermott. 1245 Pt required suctioning at this time  1430 Resting quietly  1500 Several family meds visiting updated another on the phone  1600 Scheduled meds via IV tolerated well  1730 Trach care, inner cannula cleaned, fentanyl and scop patches changed. 1800 Dilaudid 1mg IVP  1900 Report to oncoming shift      NAME OF PATIENT:  Regino RUFF Carter      LEVEL OF CARE:  GIP      REASON FOR GIP:   Pain, despite numerous changes in medications, Medication adjustment that must be monitored 24/7 and Stabilizing treatment that cannot take place at home      *PATIENT REMAINS ELIGIBLE FOR GIP LEVEL OF CARE AS EVIDENCED BY: (MUST BE ADDRESSED OF PATIENT GIP)  Pt is receiving scheduled Oxycodone and Ativan for pain that was not well managed @ home. Increased secretions today passe muer removed suctioned numerous times.  Pt requires frequent monitoring      REASON FOR RESPITE:          O2 SAFETY: Keep concentrator 6 inches from wall  Post sign on door  Educate family      FALL INTERVENTIONS PROVIDED:   Implemented/recommended use of non-skid footwear, Implemented/recommended use of fall risk identification flag to all team members, Implemented/recommended assistive devices and encouraged their use, Implemented/recommended resources for alarm system (personal alarm, bed alarm, call bell, etc.)  and Implemented/recommended environmental changes (remove hazards, lower bed, improve lighting, etc.)      INTERDISPLINARY COMMUNICATION/COLLABORATION:  Physician, MSW, [place] and RN, CNA      NEW MEDICATION INITIATION DOCUMENTATION:          Reason medication is being initiated:        MD / Provider name consulted re: change in status / initiation of new medication:        New Symptom(s):        New Order(s):        Name of the person notified of the changes:        Name of person being taught:        Instructions given:        Side Effects taught:        Response to teaching:            COMFORTABLE DYING [address] free  Is Patient/family satisfied with symptom level?  yes      DISCHARGE PLAN:  Home with family and be followed by home hospice.

## 2018-01-22 NOTE — PROGRESS NOTES
1900: Shift report received from [facility].  2010: Found pt in bed, unresponsive with some audible secretions. Lungs coarse and no bowel sounds audible. Skin warm and intact. Medicated with scheduled meds via PEG tube. 2130: pt resting. 2235: medicated with scheduled meds. 0100: Pt coughing, audible secretions. Suctioned, pt does not tolerate this well, becomes agitated. Medicated with scheduled meds. Pt repositioned on left side. 0200: Medicated with scheduled meds. 0430: Pt given full bed bath, skin intact, does not tolerate bath very well, moves arms around, grimaces. On turning, PEG leaks green bile. Dressing changed. Medicated with scheduled meds. Suctioned, pt has cough, not productive at this time. NAME OF PATIENT:  Whitney Cook Hence    LEVEL OF CARE:  GIP    REASON FOR GIP:   Unmanaged respiratory distress  Change in medications that needs to be closely monitored  Continuous suctioning due to severe secretions. *PATIENT REMAINS ELIGIBLE FOR GIP LEVEL OF CARE AS EVIDENCED BY: (MUST BE ADDRESSED OF PATIENT GIP)      REASON FOR RESPITE:  n/a    O2 SAFETY:  Concentrator positioning (6\" from furniture/drapes), No petroleum based products on face while oxygen in use and Oxygen sign on the door    FALL INTERVENTIONS PROVIDED:   Implemented/recommended use of fall risk identification flag to all team members, Implemented/recommended resources for alarm system (personal alarm, bed alarm, call bell, etc.)  and Implemented/recommended environmental changes (remove hazards, lower bed, improve lighting, etc.)    INTERDISPLINARY COMMUNICATION/COLLABORATION:  Physician, MSW, [place] and RN, CNA    NEW MEDICATION INITIATION DOCUMENTATION:  no changes made on night shift    COMFORTABLE DYING MEASURE:  Is Patient/family satisfied with symptom level?  yes    DISCHARGE PLAN:  Most likely end [facility] life at the [facility].

## 2018-01-22 NOTE — PROGRESS NOTES
[facility]  [phone]    Patient Name: Domingo Machado  YOB: 1945    Date of Provider Hospice Visit: 01/22/18    Level of Care:   [x] General Inpatient (GIP)    [] Routine   [] Respite    Location of Care:  [] [facility] [] [facility] [] [address] [] [facility] [x] Hospice House THE [facility]      Principle Hospice Diagnosis: Laryngeal cancer  Other Diagnoses: HIV     HOSPICE SUMMARY   Do not cut and paste chart information other than imaging findings    Domingo Machado is a 81y.o. year old who was admitted to [facility].      The patient's principle diagnosis of Laryngeal cancer has resulted in further local progression of disease and causing severe dysphagia. 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 Pt has undergone surgery for vocal cord lesion and has had radiation treatment. Pt has tracheostomy and PEG tube. He has had significant weight loss of 20lbs in past year. Pt also has worsening pain in oral cavity and generalized.  Reports numbness of left side of face including jaw and chin. Speech is also affected and difficult to understand at times.          Functionally, the patient's Karnofsky and/or Palliative Performance Scale has declined over a period of months and is estimated at 40%. The patient is dependent on the following ADLs:Ambulates slowly and independently with standby assist at times     Objective information that support this patients limited prognosis includes: CT Neck  Extensive postsurgical changes left oropharynx with enhancing 1.2  similar polypoid lesion in left posterior oral cavity may reflect inflamed  tissue but underlying neoplasm cannot be excluded. No sites suspicious for  metastatic neoplasm identified within the neck     The patient/family chose comfort measures with the support of Hospice. HOSPICE DIAGNOSES   Active Symptoms:  1. Diffuse uncontrolled pain  2. Fatigue/weakness  3.   Excessive upper airway secretions with foul odor  4. Foul-smelling odor from aiyana-gastrostomy tube  5. Restlessness/agitation  6. Declining function     PLAN   1. Continue [facility] level care due to symptoms of increased tracheal and airway secretions and increased pain. 2. Discontinue tube feedings and medications through the tube as patient has been features of bowel obstruction. 3. Continue scopolamine patch  4. Continue fentanyl patch  5. Dilaudid 1 mg IV every 15 minutes as needed for pain  6. Lorazepam 1 mg every 15 minutes as needed for anxiety, restlessness  7. Add robinul 0.2 mg IV every 4 hours as needed for secretions  8. Continue suctioning as needed  9. Add Haldol 0.5 mg IV every 4 hours as needed for intractable secretions. 8.  and SW to support family needs  6. Disposition: Once patient's symptoms are stabilized may need to switch back to routine care or discharge home to continue hospice care. Prognosis estimated based on 01/22/18 clinical assessment is:   [] Hours to Days    [x] Days to Weeks    [] Other:    Communicated plan of care with: Hospice Case Manager;  Hospice IDT; Care Team     GOALS OF CARE     Resuscitation Status: DNR  Durable DNR: [x] Yes [] No    Advance Care Planning 11/6/2017   Patient's Healthcare Decision Maker is: Named in scanned ACP document   Primary Decision Maker Name 250 [facility]   Primary Decision Maker Phone Number [phone]   Primary Decision Maker Relationship to Patient Sibling   Secondary Decision Maker Name 25 [facility] Phone Number [phone]   Secondary Decision Maker Relationship to Patient Other relative   Confirm Advance Directive Yes, on file        HISTORY     History obtained from: chart, hospice staff, family    CHIEF COMPLAINT: N/a  The patient is:   [] Verbal  [] Nonverbal  [x] Unresponsive    HPI/SUBJECTIVE: Patient remains lethargic and poorly responsive, labored breathing, increased airway and trach secretions requiring frequent suctioning and frequent medication administration. Patient also having features of bowel obstruction and increased residuals from the PEG tube. NG tube has been connected to suction and drainage and is producing dark concentrated foul-smelling and feculent material.     REVIEW OF SYSTEMS     The following systems were: [] reviewed  [x] unable to be reviewed      Adult Non-Verbal Pain Assessment Score: 7    Face  [] 0   No particular expression or smile  [x] 1   Occasional grimace, tearing, frowning, wrinkled forehead  [] 2   Frequent grimace, tearing, frowning, wrinkled forehead    Activity (movement)  [] 0   Lying quietly, normal position  [x] 1   Seeking attention through movement or slow, cautious movement  [] 2   Restless, excessive activity and/or withdrawal reflexes    Guarding  [] 0   Lying quietly, no positioning of hands over areas of body  [] 1   Splinting areas of the body, tense  [x] 2   Rigid, stiff    Physiology (vital signs)  [] 0   Stable vital signs  [x] 1   Change in any of the following: SBP > 20mm Hg; HR > 20/minute  [] 2   Change in any of the following: SBP > 30mm Hg; HR > 25/minute    Respiratory  [] 0   Baseline RR/SpO2, compliant with ventilator  [] 1   RR > 10 above baseline, or 5% drop SpO2, mild asynchrony with ventilator  [x] 2   RR > 20 above baseline, or 10% drop SpO2, asynchrony with ventilator     FUNCTIONAL ASSESSMENT     Palliative Performance Scale (PPS): 10-20%     PSYCHOSOCIAL/SPIRITUAL ASSESSMENT     Active Problems:    * No active hospital problems. *    Past Medical History:   Diagnosis Date    Cancer ([facility] [address]     Throat    HIV (human immunodeficiency virus infection) ([facility]       Past Surgical History:   Procedure Laterality Date    HX GI      feeding tube    HX HEENT      throat cancer removed, trach      Social History   Substance Use Topics    Smoking status: Former Smoker    Smokeless tobacco: Never Used    Alcohol use No     No family history on file.    No Known Allergies   Current Facility-Administered Medications   Medication Dose Route Frequency    LORazepam (ATIVAN) injection 1 mg  1 mg IntraVENous Q4H    LORazepam (ATIVAN) injection 1 mg  1 mg IntraVENous Q15MIN PRN    HYDROmorphone (DILAUDID) injection 1 mg  1 mg IntraVENous Q3H    HYDROmorphone (DILAUDID) injection 1 mg  1 mg IntraVENous Q15MIN PRN    acetaminophen (TYLENOL) suppository 650 mg  650 mg Rectal Q4H PRN    bisacodyl (DULCOLAX) suppository 10 mg  10 mg Rectal DAILY PRN    scopolamine (TRANSDERM-SCOP) 1 mg 1 Patch  1 Patch TransDERmal Q72H    fentaNYL (DURAGESIC) 25 mcg/hr patch 1 Patch  1 Patch TransDERmal Q72H        PHYSICAL EXAM     Wt Readings from Last 3 Encounters:   01/16/18 50.8 kg (112 lb)   01/09/18 49.9 kg (110 lb)   11/15/17 54.4 kg (120 lb)       Visit Vitals    BP (!) 82/50 (BP 1 Location: Left arm)    Pulse 100    Temp 98 °F (36.7 °C)    Resp 21    SpO2 (!) 88%       Supplemental O2  [x] Yes  [] NO  Last bowel movement:     Currently this patient has:  [x] Peripheral IV [] PICC  [] PORT [] ICD    [x] Serrano Catheter [] NG Tube   [] PEG Tube    [] Rectal Tube [x] Drain: G-tube connected to drainage  [x] Other: trach    Constitutional: ill-appearing man, lying in bed, foul-smelling odor, lethargic, increased trach secretions and mucous requiring frequent suctioning.   Eyes: Closed  ENMT: Increased airway secretions  Cardiovascular: Distant heart sounds  Respiratory: Slightly labored breathing with chest secretions  Gastrointestinal:, Firm, distended, bowel sounds reduced, tenderness to palpation with no rebound, G-tube connected to suction and draining foul-smelling dark feculent material.  Musculoskeletal: Diffuse tenderness to palpation, no contractures or deformities  Skin: No rashes, warm and dry; no skin or extremity mottling  Neurologic: does not respond to voice; slight grimacing during exam  Psychiatric: unable to assess due to medical condition  Other:       Pertinent Lab and or Imaging Tests:  Lab Results   Component Value Date/Time    Sodium 133 09/03/2017 09:39 PM    Potassium 3.9 09/03/2017 09:39 PM    Chloride 99 09/03/2017 09:39 PM    CO2 30 09/03/2017 09:39 PM    Anion gap 4 09/03/2017 09:39 PM    Glucose 76 09/03/2017 09:39 PM    BUN 16 09/03/2017 09:39 PM    Creatinine 0.96 09/03/2017 09:39 PM    BUN/Creatinine ratio 17 09/03/2017 09:39 PM    GFR est AA >60 09/03/2017 09:39 PM    GFR est non-AA >60 09/03/2017 09:39 PM    Calcium 8.7 09/03/2017 09:39 PM     Lab Results   Component Value Date/Time    Protein, total 7.7 09/03/2017 09:39 PM    Albumin 2.8 09/03/2017 09:39 PM           Total time: 35 minutes  Counseling / coordination time: 15 minutes spent in discussing case with hospice staff and met with the family including patient's brother and sister and reviewed patient's condition, prognosis and care plan with them  > 50% counseling / coordination?:

## 2018-01-23 NOTE — PROGRESS NOTES
Hanny Massey 262 Follow-up Visit     This patient was transferred from his home to the [facility] due to symptom issues. He had been visited at his home by Terri Jiménez  in late December, 2017. An initial Spiritual Assessment was completed at that time. Today, I visited the room of this pt who is @ [facility] now on GIP status. He was in bed, unresponsive, breathing via a trach tube displaying a regular pattern. He was not agitated, not restless, and appeared well medicated and comfortable. His brother, Samantha Maguire was present. He was open to my visit but said he needed to leave to go to work. I asked if I could read Scripture and Psalms to his brother and he said \"I guess that wouldn't hurt. He wasn't very Mandaeism. \"  I gave him my contact information and noted he or ana of the family could reach out to me for support if they so desired. He thanked me and then left. I read Scripture and Psalms to the patient, offered words of comfort, assurance, spiritual encouragement, prayer and a blessing. I also left my contact information in his room so other family members would know of my visit and offer of support. GOALS:  Continue to visit this pt and his family, to provide pastoral care and spiritual support to assist both the pt and them with coping with this decline. Coordinate with Terri Jiménez regarding his care for this patient and family with whom he has a relationship. Build trust and familiarity with the family to encourage a discussion of their spiritual thoughts and concerns at a deeper and more personal level if they so choose. Validate the emotions and normalize the anticipatory grief of the family regarding this declining situation. PLAN:   Continue to visit this pt and his family, while he is @ [facility] and I am in this facility, or PRN as requested. Coordinate with Care Team on POC.  VISIT FREQUENCY:   1 wk. 1,2 starting 1/23/18  plus 4 prn 14 days.    Meeta Zayas. Veronica, [address] [place] [address], [address] [place], [place] [id number]

## 2018-01-23 NOTE — PROGRESS NOTES
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      The patient's principle diagnosis of Laryngeal cancer has resulted in further local progression of disease and causing severe dysphagia. 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 Pt has undergone surgery for vocal cord lesion and has had radiation treatment. Pt has tracheostomy and PEG tube. He has had significant weight loss of 20lbs in past year. Pt also has worsening pain in oral cavity and generalized.  Reports numbness of left side of face including jaw and chin. Speech is also affected and difficult to understand at times.          Functionally, the patient's Karnofsky and/or Palliative Performance Scale has declined over a period of months and is estimated at 40%. The patient is dependent on the following ADLs:Ambulates slowly and independently with standby assist at times     Objective information that support this patients limited prognosis includes: CT Neck  Extensive postsurgical changes left oropharynx with enhancing 1.2  similar polypoid lesion in left posterior oral cavity may reflect inflamed  tissue but underlying neoplasm cannot be excluded. No sites suspicious for  metastatic neoplasm identified within the neck     The patient/family chose comfort measures with the support of Hospice. HOSPICE DIAGNOSES   Active Symptoms:  1. Diffuse pain: Stable  2. Fatigue/weakness: Worse  3.   Excessive upper airway secretions with foul odor: Better  4. Foul-smelling odor from aiyana-gastrostomy tube: Worse  5. Restlessness/agitation: Stable  6. Declining function: Worse     PLAN   1. Change level care to routine level as patient is not requiring close monitoring of frequent med administration. 2. Add Flagyl 250 mg crushed topical to be applied around the stoma site on G-tube and trach site to reduce a foul odor. 3. Continue scopolamine patch and fentanyl patch  4. Dilaudid 1 mg IV every 15 minutes as needed for pain  5. Lorazepam 1 mg every 15 minutes as needed for anxiety, restlessness  6. Continue robinul 0.2 mg IV every 4 hours as needed for secretions  7. Continue suctioning as needed  8. Continue Haldol 0.5 mg IV every 4 hours as needed for intractable secretions. 5.  and SW to support family needs  10. Disposition: Switch back to routine hospice care    Prognosis estimated based on 01/23/18 clinical assessment is:   [] Hours to Days    [x] Days to Weeks    [] Other:    Communicated plan of care with: Hospice Case Manager;  Hospice IDT; Care Team     GOALS OF CARE     Resuscitation Status: DNR  Durable DNR: [x] Yes [] No    Advance Care Planning 11/6/2017   Patient's Healthcare Decision Maker is: Named in scanned ACP document   Primary Decision Maker Name 250 [facility]   Primary Decision Maker Phone Number [phone]   Primary Decision Maker Relationship to Patient Sibling   Secondary Decision Maker Name 25 [facility] Phone Number [phone]   Secondary Decision Maker Relationship to Patient Other relative   Confirm Advance Directive Yes, on file        HISTORY     History obtained from: chart, hospice staff    CHIEF COMPLAINT: N/a  The patient is:   [] Verbal  [] Nonverbal  [x] Unresponsive    HPI/SUBJECTIVE: Patient remains lethargic and poorly responsive, breathing still slightly labored with some upper airway secretions requiring intermittent suctioning but overall secretions have been less and better controlled with the help of robinul. Patient does not appear to be in pain. There is a strong foul odor especially due to the drainage from the G-tube. Patient requiring all scheduled IV medications but has not needed any as needed medications. REVIEW OF SYSTEMS     The following systems were: [] reviewed  [x] unable to be reviewed      Adult Non-Verbal Pain Assessment Score: 4    Face  [] 0   No particular expression or smile  [x] 1   Occasional grimace, tearing, frowning, wrinkled forehead  [] 2   Frequent grimace, tearing, frowning, wrinkled forehead    Activity (movement)  [] 0   Lying quietly, normal position  [x] 1   Seeking attention through movement or slow, cautious movement  [] 2   Restless, excessive activity and/or withdrawal reflexes    Guarding  [x] 0   Lying quietly, no positioning of hands over areas of body  [] 1   Splinting areas of the body, tense  [] 2   Rigid, stiff    Physiology (vital signs)  [] 0   Stable vital signs  [x] 1   Change in any of the following: SBP > 20mm Hg; HR > 20/minute  [] 2   Change in any of the following: SBP > 30mm Hg; HR > 25/minute    Respiratory  [] 0   Baseline RR/SpO2, compliant with ventilator  [] 1   RR > 10 above baseline, or 5% drop SpO2, mild asynchrony with ventilator  [x] 2   RR > 20 above baseline, or 10% drop SpO2, asynchrony with ventilator     FUNCTIONAL ASSESSMENT     Palliative Performance Scale (PPS): 10-20%     PSYCHOSOCIAL/SPIRITUAL ASSESSMENT     Active Problems:    * No active hospital problems. *    Past Medical History:   Diagnosis Date    Cancer ([facility] [address]     Throat    [facility]       Past Surgical History:   Procedure Laterality Date    HX GI      feeding tube    HX HEENT      throat cancer removed, trach      Social History   Substance Use Topics    Smoking status: Former Smoker    Smokeless tobacco: Never Used    Alcohol use No     No family history on file.    No Known Allergies   Current Facility-Administered Medications   Medication Dose Route Frequency    metroNIDAZOLE (FLAGYL) tablet 250 mg  250 mg Oral BID    LORazepam (ATIVAN) injection 1 mg  1 mg IntraVENous Q4H    LORazepam (ATIVAN) injection 1 mg  1 mg IntraVENous Q15MIN PRN    HYDROmorphone (DILAUDID) injection 1 mg  1 mg IntraVENous Q3H    HYDROmorphone (DILAUDID) injection 1 mg  1 mg IntraVENous Q15MIN PRN    glycopyrrolate (ROBINUL) injection 0.2 mg  0.2 mg IntraVENous Q4H PRN    haloperidol lactate (HALDOL) injection 0.5 mg  0.5 mg IntraMUSCular Q4H PRN    acetaminophen (TYLENOL) suppository 650 mg  650 mg Rectal Q4H PRN    bisacodyl (DULCOLAX) suppository 10 mg  10 mg Rectal DAILY PRN    scopolamine (TRANSDERM-SCOP) 1 mg 1 Patch  1 Patch TransDERmal Q72H    fentaNYL (DURAGESIC) 25 mcg/hr patch 1 Patch  1 Patch TransDERmal Q72H        PHYSICAL EXAM     Wt Readings from Last 3 Encounters:   01/16/18 50.8 kg (112 lb)   01/09/18 49.9 kg (110 lb)   11/15/17 54.4 kg (120 lb)       Visit Vitals    /70    Pulse 81    Temp 99.6 °F (37.6 °C)    Resp 28    SpO2 91%       Supplemental O2  [x] Yes  [] NO  Last bowel movement:     Currently this patient has:  [x] Peripheral IV [] PICC  [] PORT [] ICD    [x] Serrano Catheter [] NG Tube   [] PEG Tube    [] Rectal Tube [x] Drain: G-tube connected to drainage  [x] Other: trach    Constitutional: ill-appearing man, lying in bed, foul-smelling odor, lethargic  Eyes: Closed  ENMT: Some airway secretions  Cardiovascular: Distant heart sounds  Respiratory: Non labored breathing  Gastrointestinal:, Firm, distended, bowel sounds reduced, tenderness to palpation with no rebound, G-tube connected to suction and draining foul-smelling dark feculent material.  Musculoskeletal: Diffuse tenderness to palpation, no contractures or deformities  Skin: No rashes, warm and dry; no skin or extremity mottling  Neurologic: does not respond to voice; slight grimacing during exam  Psychiatric: unable to assess due to medical condition  Other:       Pertinent Lab and or Imaging Tests:  Lab Results   Component Value Date/Time    Sodium 133 09/03/2017 09:39 PM    Potassium 3.9 09/03/2017 09:39 PM    Chloride 99 09/03/2017 09:39 PM    CO2 30 09/03/2017 09:39 PM    Anion gap 4 09/03/2017 09:39 PM    Glucose 76 09/03/2017 09:39 PM    BUN 16 09/03/2017 09:39 PM    Creatinine 0.96 09/03/2017 09:39 PM    BUN/Creatinine ratio 17 09/03/2017 09:39 PM    GFR est AA >60 09/03/2017 09:39 PM    GFR est non-AA >60 09/03/2017 09:39 PM    Calcium 8.7 09/03/2017 09:39 PM     Lab Results   Component Value Date/Time    Protein, total 7.7 09/03/2017 09:39 PM    Albumin 2.8 09/03/2017 09:39 PM           Total time: 35 minutes  Counseling / coordination time: 15 minutes spent in discussing case with hospice staff  > 50% counseling / coordination?:  No

## 2018-01-23 NOTE — PROGRESS NOTES
0700 Report received. 0740  Pt lying in bed, unresponsive. No facial grimacing or moaning at this time. Lungs diminished. No cough noted. No secretions noted. Bowel sounds absent. Stomach is very flat. Pt is very cachectic. Dressing over PEG is dry and intact. PEG draining to gravity. Scant amt of greenish bile in tubing. Serrano is draining lazarus urine. Last reported BM was 1-19. No edema noted. Dressing around trach is intact. Pt has an IV in his left arm. Dressing is clear and intact. 0830  Pt resting. No signs of distress. 1040  Pt medicated with scheduled meds. 1200  Pt medicated with scheduled meds. Family at the bedside. Rn educated on the signs and symptoms of the dying process. 1410  Pt resting no signs of distress. 1600  Pt continues to rest comfortably. Respirations shallow but regular. Pt medicated with scheduled   1800  Pt medicated with scheduled meds. Flagyl crushed and applied around pt's PEG tube for odor control. No distress noted. No secretions noted. No facial grimacing or moaning at this time. 1900  Report given.       NAME OF PATIENT:  Darlin Grady Hence    LEVEL OF CARE:  [facility] level of care changed to Routine    REASON FOR GIP:   Medication adjustment that must be monitored 24/7 and Stabilizing treatment that cannot take place at home    *PATIENT REMAINS ELIGIBLE FOR [facility] LEVEL OF CARE AS EVIDENCED BY: (MUST BE ADDRESSED OF PATIENT GIP)  n/a      REASON FOR RESPITE:  N/A      O2 SAFETY:  3lpm  Oxygen sign on the door    FALL INTERVENTIONS PROVIDED:   Implemented/recommended resources for alarm system (personal alarm, bed alarm, call bell, etc.)  and Implemented/recommended environmental changes (remove hazards, lower bed, improve lighting, etc.)    INTERDISPLINARY COMMUNICATION/COLLABORATION:  Physician, MSW, [place] and RN, CNA    NEW MEDICATION INITIATION DOCUMENTATION:  Flagyl      Reason medication is being initiated:  Pt has an odor coming from around his rosemary      MD / Provider name consulted re: change in status / initiation of new medication:  Dr Riccardo Black Symptom(s):  No new symptoms. New Order(s):  Flagyl 250 mg BID. Crush and sprinkle around stoma and surrounding PEG tube. Name of the person notified of the changes:  Family not present    Name of person being taught:  n/a    Instructions given:  None    Side Effects taught:  None    Response to teaching:  n/a      COMFORTABLE DYING MEASURE:  Is Patient/family satisfied with symptom level?  yes    DISCHARGE PLAN:   Pt will remain at the [facility] until his symptoms are managed and then will return home and continue to be followed by Home Hospice.

## 2018-01-23 NOTE — PROGRESS NOTES
1900: Shift report received from [address]: Pt in bed, resting quietly. Lungs coarse and bowel sounds hypoactive. Breathing fairly regular, but elevated. Pt on 2 liters of moisturized oxygen for comfort. Niece coming in to see pt/her uncle. She has not seen him in about a month, she states. She did not know what condition he was in. She was updated on hospice philosophy and pt status. She appeared to be handling it well. 2045: Another visitor arrived for pt, nephew, he was updated as well, as he has not seen his uncle since [de-identified]. Explained hospice philosophy and informed him about pt status. 2100: Medicated with scheduled meds. Resp at 26 bpm.  0010: Medicated with scheduled meds. breathing more labored with secretions. Pt suctioned. 0400: Pt has temp of 101.4, Tylenol supp administered. Medicated with scheduled meds. Pt turned. Secretions audible. Suctioned. 3943: Medicated with scheduled meds. NAME OF PATIENT:  Osbaldo Ferrer Carter    LEVEL OF CARE:  GIP    REASON FOR GIP:   Unmanageable respiratory distress, Medication adjustment that must be monitored 24/7 and Stabilizing treatment that cannot take place at home    *PATIENT REMAINS ELIGIBLE FOR GIP LEVEL OF CARE AS EVIDENCED BY: (MUST BE ADDRESSED OF PATIENT GIP)      REASON FOR RESPITE:  n/a    O2 SAFETY:  Concentrator positioning (6\" from furniture/drapes), No petroleum based products on face while oxygen in use and Oxygen sign on the door    FALL INTERVENTIONS PROVIDED:   Implemented/recommended resources for alarm system (personal alarm, bed alarm, call bell, etc.)  and Implemented/recommended environmental changes (remove hazards, lower bed, improve lighting, etc.)    INTERDISPLINARY COMMUNICATION/COLLABORATION:  Physician, MSW, Che and RN, CNA    NEW MEDICATION INITIATION DOCUMENTATION:  changes made on day shift and continuing new med regimen on night shift    COMFORTABLE DYING MEASURE:  Is Patient/family satisfied with symptom level? yes    DISCHARGE PLAN:  Most likely end of life at the [facility].

## 2018-01-24 NOTE — PROGRESS NOTES
0700  Report received. 0715  Pt lying in bed, unresponsive. No facial grimacing or moaning at this time. Lungs diminished but clear. O2 at 3lpm.  Pt has a trach. Dressing changed yest.  Dressing is dry and intact. Bowel sounds absent. Last reported Bm was 1-19. Pt has a PEG draining to gravity. PEG tubing w a small amt of dark green bile present. Dressing is dry and intact. Serrano is draining dark lazarus urine. No edema noted. Skin is intact. Pt has IV sites in his R and L forearm. Dressings are clear and intact. 0900  Pt with a temp of 103.7  Pt medicated with tylenol supp  And scheduled meds. 0945  Pt resting comfortably. No signs of distress. 1000  Report given to VINNIE DIXON. NAME OF PATIENT:  Kenzie Phlegm Hence    LEVEL OF CARE:  Routine    O2 SAFETY:  Oxygen sign on the door    FALL INTERVENTIONS PROVIDED:   Implemented/recommended resources for alarm system (personal alarm, bed alarm, call bell, etc.)     INTERDISPLINARY COMMUNICATION/COLLABORATION:  Physician, MSW, Che and RN, CNA    NEW MEDICATION INITIATION DOCUMENTATION:  No new medications initiated. COMFORTABLE DYING MEASURE:  Is Patient/family satisfied with symptom level?  yes    DISCHARGE PLAN:  Pt will remain at the [facility] until his family makes alternative living arrangements.

## 2018-01-24 NOTE — PROGRESS NOTES
Problem: Falls - Risk of  Goal: *Absence of Falls  Document Ion Fall Risk and appropriate interventions in the flowsheet.    Outcome: Progressing Towards Goal  Fall Risk Interventions:  Mobility Interventions: Bed/chair exit alarm    Mentation Interventions: Bed/chair exit alarm, Door open when patient unattended    Medication Interventions: Bed/chair exit alarm    Elimination Interventions: Bed/chair exit alarm

## 2018-01-24 NOTE — PROGRESS NOTES
1900 Report received from [facility]. Pt is Routine level of care. Dx Laryngeal Cancer. 2000 Pt is unresponsive to verbal or tactile stimuli. Extremities are flaccid. PEG tube to gravity drain into bedside collection bag. Small amt green output. Serrano has small amt dark lazarus urine. Trach patent,lungs are coarse. No respiratory distress noted. Pt repositioned for comfort. 2030 Spoke with pts sister, Sosa Catalan who called to check on pt. She is aware of his condition. She is inquiring about planned family mtg tomorrow. None noted but instructed her to call back in am after 8am to confirm with MSW or MD.  2100 Phone call from pts brother Rafa Guillory, also asking about a planned mtg. Also instructed to call after 8 am to confirm. 2300 Pt repositioned to right side. 0030 Scheduled meds given to manage pain and promote comfort.       NAME OF PATIENT:  Stuart Machado    LEVEL OF CARE:  Routine    REASON FOR GIP:   na    *PATIENT REMAINS ELIGIBLE FOR GIP LEVEL OF CARE AS EVIDENCED BY: (MUST BE ADDRESSED OF PATIENT GIP)  na      REASON FOR RESPITE:  na    O2 SAFETY:  Concentrator positioning (6\" from furniture/drapes), No petroleum based products on face while oxygen in use and Oxygen sign on the door    FALL INTERVENTIONS PROVIDED:   Implemented/recommended use of fall risk identification flag to all team members, Implemented/recommended resources for alarm system (personal alarm, bed alarm, call bell, etc.) , Implemented/recommended environmental changes (remove hazards, lower bed, improve lighting, etc.) and Implemented/recommended increased supervision/assistance    INTERDISPLINARY COMMUNICATION/COLLABORATION:  Physician, MSW, [place] and RN, CNA    NEW MEDICATION INITIATION DOCUMENTATION:  Documentation completed in Clinical Note in [facility]    Reason medication is being initiated:  na    MD / Provider name consulted re: change in status / initiation of new medication:  na    New Symptom(s):  na  New Order(s): na    Name of the person notified of the changes:  na    Name of person being taught:  na    Instructions given: na    Side Effects taught:  na    Response to teaching:  na      COMFORTABLE DYING MEASURE:  Is Patient/family satisfied with symptom level?   Yes    DISCHARGE PLAN: EOL

## 2018-01-24 NOTE — PROGRESS NOTES
1700  Pt medicated with scheduled meds. CNA in to give a bath. Dressings changed around PEG and Trach. Flagyl applied.

## 2018-01-24 NOTE — PROGRESS NOTES
1000:Report received from RN, pt continues to be nonresponsive to verbal and tactile stimuli. No acute distress noted, pt remains comfortable. 11:15:Repositioned for comfort, schedule meds adm by Joel Torres. 13:30:Family meeting with SW.  15:30:Pt remains comfortable no change in LOC.   1700:Schedule meds adm by Beau Bui RN via IV, wound care done to PEG site  1900:Report given to RN

## 2018-01-24 NOTE — HOSPICE
Spoke with sister Jennfier Perez to schedule meeting for tomorrow at 1pm to discuss discharge planning. Patient was changed from GIP to Routine level of care today. Should family wish to continue care at [facility] msw will do a Financial profile for assistance.

## 2018-01-25 NOTE — PROGRESS NOTES
Bedside and Verbal shift change report given to Reyna Baptiste4 (oncoming nurse) by Ge Aviles LPN (offgoing nurse). Report included the following information SBAR, Kardex, Intake/Output and MAR. NAME OF PATIENT:  Aydin Machado    LEVEL OF CARE:  ROUTINE    REASON FOR GIP:   NA    *PATIENT REMAINS ELIGIBLE FOR GIP LEVEL OF CARE AS EVIDENCED BY: (MUST BE ADDRESSED OF PATIENT GIP)      REASON FOR RESPITE:  NA    O2 SAFETY:  Concentrator positioning (6\" from furniture/drapes), Tanks stored in hernandez , No petroleum based products on face while oxygen in use and Oxygen sign on the door    FALL INTERVENTIONS PROVIDED:   REYNA    INTERDISPLINARY COMMUNICATION/COLLABORATION:  Physician, MSW, Che and RN, CNA    NEW MEDICATION INITIATION DOCUMENTATION:  NA    Reason medication is being initiated:  REYNA    MD / Provider name consulted re: change in status / initiation of new medication:  NA    New Symptom(s): NA    New Order(s): NA    Name of the person notified of the changes:  NA    Name of person being taught:  REYNA    Instructions given:  REYNA    Side Effects taught: REYNA    Response to teaching:  NA    COMFORTABLE DYING MEASURE:  Is Patient/family satisfied with symptom level?  yes    DISCHARGE PLAN:  Remain at [facility] until end of life. 20:00  Patient remains unresponsive to verbal or painful stimuli. Serrano cath patent draining small amt of dark lazarus urine. 02 @ 3L via trach collar, peg tube site dressing dry and intact. No respiratory distress noted. Turned and repositioned for comfort. Medicated with scheduled meds, resting quietly. 00:30 Patient unresponsive, no pulse rate, auscultation of heart for 1 full min. Pronounced by Shanta Brink RN. MD and family notified.

## 2018-01-29 NOTE — DISCHARGE SUMMARY
Discharge Summary    [facility]  [facility]  [phone]      Date of Admission: 1/19/2018  Date of Discharge: 1/25/2018    Osbaldo Machado is a 81y.o. year old who was admitted to [address] at [facility] with a Hospice diagnosis of Laryngeal Cancer. HOSPICE SUMMARY       Osbaldo Machado is a 81y.o. year old who was admitted to [address] at the [facility] for uncontrolled pain at home.     The patient's principle diagnosis has resulted in weakness, severe cachexia, malnutrition, uncontrolled pain in the head and neck area, fatigue, debility, excessive airway secretions, foul smelling discharge from trache and gastrostomy peg tube site.         Functionally, the patient's Karnofsky and/or Palliative Performance Scale has declined over a period of weeks and is estimated at 40%. The patient is dependent on the following ADLs: he is dependent for some ADLs, performs some personal care but is not able to do on his own effectively, needs assistance.     Objective information that support this patients limited prognosis includes:  . MPRESSION: Extensive postsurgical changes left oropharynx with enhancing 1.2  similar polypoid lesion in left posterior oral cavity may reflect inflamed  tissue but underlying neoplasm cannot be excluded. No sites suspicious for  metastatic neoplasm identified within the neck. Incidental findings as above. 9/3/17  IMPRESSION: Extensive postsurgical changes left oropharynx with enhancing 1.2  similar polypoid lesion in left posterior oral cavity may reflect inflamed  tissue but underlying neoplasm cannot be excluded. No sites suspicious for  metastatic neoplasm identified within the neck. Incidental findings as above.      The patient/family chose comfort measures with the support of Hospice.      HOSPICE DIAGNOSES   Active Symptoms:  1. Excessive airway secretions with foul odor  2.  Intractable pain in the right side the neck and frontal aspect of the head  3. Foul smelling odor perigastrostomy tube  4. Anxiety and agitation       PLAN   1. Pt admitted for GIP level of care due to overwhelming symptoms of pain and agitation uncontrolled at home and not sustainable at home  2. Scheduled lorazepam 1mg via peg every 4 hours  3. Scheduled Roxicodone 20mg every 3 hours  4. Scopolamine  patch  5. Robitussin 600 mg via peg every 4 hours prn  6. Roxanol 20 mg via peg every 1 hour as needed for pain  7. Haldol 0.5mg every hours as needed for agitation  8. Lorazepam 0.5mg as needed every 1 hour  9. Comfort measures  10. Discharge home after symptoms are managed   11.  and  to assist  12. HIV medications from home via PEG tube       The patient's care was focused on comfort and the patient passed away on 1/25/2018.
